# Patient Record
Sex: MALE | Race: WHITE | NOT HISPANIC OR LATINO | Employment: FULL TIME | URBAN - METROPOLITAN AREA
[De-identification: names, ages, dates, MRNs, and addresses within clinical notes are randomized per-mention and may not be internally consistent; named-entity substitution may affect disease eponyms.]

---

## 2007-02-09 LAB — EXTERNAL HIV SCREEN: NORMAL

## 2018-02-17 ENCOUNTER — OFFICE VISIT (OUTPATIENT)
Dept: URGENT CARE | Facility: CLINIC | Age: 43
End: 2018-02-17
Payer: COMMERCIAL

## 2018-02-17 VITALS
OXYGEN SATURATION: 98 % | RESPIRATION RATE: 16 BRPM | TEMPERATURE: 98.6 F | HEART RATE: 90 BPM | SYSTOLIC BLOOD PRESSURE: 171 MMHG | DIASTOLIC BLOOD PRESSURE: 95 MMHG

## 2018-02-17 DIAGNOSIS — J01.10 ACUTE FRONTAL SINUSITIS, RECURRENCE NOT SPECIFIED: Primary | ICD-10-CM

## 2018-02-17 PROCEDURE — 99203 OFFICE O/P NEW LOW 30 MIN: CPT | Performed by: NURSE PRACTITIONER

## 2018-02-17 RX ORDER — AMOXICILLIN AND CLAVULANATE POTASSIUM 875; 125 MG/1; MG/1
1 TABLET, FILM COATED ORAL EVERY 12 HOURS SCHEDULED
Qty: 20 TABLET | Refills: 0 | Status: SHIPPED | OUTPATIENT
Start: 2018-02-17 | End: 2018-02-27

## 2018-02-17 RX ORDER — LISINOPRIL 20 MG/1
20 TABLET ORAL DAILY
COMMUNITY

## 2018-02-17 NOTE — PROGRESS NOTES
Assessment/Plan:    No problem-specific Assessment & Plan notes found for this encounter  Diagnoses and all orders for this visit:    Acute frontal sinusitis, recurrence not specified  -     amoxicillin-clavulanate (AUGMENTIN) 875-125 mg per tablet; Take 1 tablet by mouth every 12 (twelve) hours for 10 days    Other orders  -     lisinopril (ZESTRIL) 20 mg tablet; Take 20 mg by mouth daily          Subjective:      Patient ID: Sally Das is a 43 y o  male  71-year-old male at urgent care with chief complaint of nasal congestion pressure cough and chest congestion for 10 days denies any fevers chills or headaches        The following portions of the patient's history were reviewed and updated as appropriate:   He  has no past medical history on file  He  does not have a problem list on file  He  has no past surgical history on file  His family history is not on file  He  has no tobacco, alcohol, and drug history on file  Current Outpatient Prescriptions   Medication Sig Dispense Refill    lisinopril (ZESTRIL) 20 mg tablet Take 20 mg by mouth daily      amoxicillin-clavulanate (AUGMENTIN) 875-125 mg per tablet Take 1 tablet by mouth every 12 (twelve) hours for 10 days 20 tablet 0     No current facility-administered medications for this visit  No current outpatient prescriptions on file prior to visit  No current facility-administered medications on file prior to visit  He has No Known Allergies       Review of Systems   Constitutional: Negative  HENT: Positive for congestion, postnasal drip, rhinorrhea, sinus pain and sinus pressure  Negative for dental problem, drooling, ear discharge, ear pain, facial swelling, hearing loss, mouth sores, nosebleeds, sneezing, sore throat, tinnitus, trouble swallowing and voice change  Eyes: Negative  Respiratory: Positive for cough  Negative for apnea, choking, chest tightness, shortness of breath, wheezing and stridor  Cardiovascular: Negative for chest pain, palpitations and leg swelling  Gastrointestinal: Negative  Negative for abdominal distention, abdominal pain, anal bleeding, blood in stool, constipation, diarrhea, nausea, rectal pain and vomiting  Endocrine: Negative  Genitourinary: Negative  Musculoskeletal: Negative  Skin: Negative  Allergic/Immunologic: Negative  Neurological: Negative  Hematological: Negative  Psychiatric/Behavioral: Negative  Objective:      BP (!) 171/95   Pulse 90   Temp 98 6 °F (37 °C)   Resp 16   SpO2 98%          Physical Exam   Constitutional: He is oriented to person, place, and time  Vital signs are normal  He appears well-developed and well-nourished  HENT:   Head: Normocephalic and atraumatic  Right Ear: Hearing, tympanic membrane, external ear and ear canal normal    Left Ear: Hearing, tympanic membrane, external ear and ear canal normal    Nose: Rhinorrhea and sinus tenderness present  Right sinus exhibits frontal sinus tenderness  Left sinus exhibits frontal sinus tenderness  Mouth/Throat: Uvula is midline and mucous membranes are normal  Posterior oropharyngeal erythema present  Eyes: Conjunctivae and EOM are normal  Pupils are equal, round, and reactive to light  Neck: Trachea normal, normal range of motion and full passive range of motion without pain  Cardiovascular: Normal rate and regular rhythm  Pulmonary/Chest: Effort normal and breath sounds normal    Abdominal: Soft  Normal appearance  Musculoskeletal: Normal range of motion  Lymphadenopathy:     He has cervical adenopathy  Right cervical: Superficial cervical adenopathy present  Left cervical: Superficial cervical adenopathy present  Neurological: He is alert and oriented to person, place, and time  Skin: Skin is warm and dry  Psychiatric: He has a normal mood and affect   His behavior is normal  Judgment and thought content normal

## 2018-12-06 ENCOUNTER — TRANSCRIBE ORDERS (OUTPATIENT)
Dept: ADMINISTRATIVE | Facility: HOSPITAL | Age: 43
End: 2018-12-06

## 2018-12-06 DIAGNOSIS — R79.89 ELEVATED LFTS: ICD-10-CM

## 2018-12-06 DIAGNOSIS — E04.1 THYROID NODULE: Primary | ICD-10-CM

## 2019-01-12 ENCOUNTER — TRANSCRIBE ORDERS (OUTPATIENT)
Dept: ADMINISTRATIVE | Facility: HOSPITAL | Age: 44
End: 2019-01-12

## 2019-01-12 ENCOUNTER — APPOINTMENT (OUTPATIENT)
Dept: LAB | Facility: HOSPITAL | Age: 44
End: 2019-01-12
Attending: INTERNAL MEDICINE
Payer: COMMERCIAL

## 2019-01-12 ENCOUNTER — HOSPITAL ENCOUNTER (OUTPATIENT)
Dept: ULTRASOUND IMAGING | Facility: HOSPITAL | Age: 44
Discharge: HOME | End: 2019-01-12
Attending: INTERNAL MEDICINE
Payer: COMMERCIAL

## 2019-01-12 DIAGNOSIS — R79.89 ELEVATED LFTS: ICD-10-CM

## 2019-01-12 DIAGNOSIS — R79.89 ELEVATED LFTS: Primary | ICD-10-CM

## 2019-01-12 DIAGNOSIS — E04.1 THYROID NODULE: ICD-10-CM

## 2019-01-12 LAB
ALBUMIN SERPL BCP-MCNC: 4.3 G/DL (ref 3.5–5.7)
ALP SERPL-CCNC: 39 U/L (ref 40–150)
ALT SERPL W P-5'-P-CCNC: 25 U/L (ref 7–52)
AST SERPL W P-5'-P-CCNC: 20 U/L (ref 13–39)
BILIRUB DIRECT SERPL-MCNC: 0.1 MG/DL (ref 0–0.2)
BILIRUB SERPL-MCNC: 0.3 MG/DL (ref 0.2–1)
PROT SERPL-MCNC: 7.1 G/DL (ref 6.4–8.9)

## 2019-01-12 PROCEDURE — 76536 US EXAM OF HEAD AND NECK: CPT

## 2019-01-12 PROCEDURE — 36415 COLL VENOUS BLD VENIPUNCTURE: CPT

## 2019-01-12 PROCEDURE — 76705 ECHO EXAM OF ABDOMEN: CPT

## 2019-01-12 PROCEDURE — 80076 HEPATIC FUNCTION PANEL: CPT

## 2019-04-05 ENCOUNTER — TRANSCRIBE ORDERS (OUTPATIENT)
Dept: LAB | Facility: MEDICAL CENTER | Age: 44
End: 2019-04-05

## 2019-04-05 ENCOUNTER — APPOINTMENT (OUTPATIENT)
Dept: LAB | Facility: MEDICAL CENTER | Age: 44
End: 2019-04-05
Payer: COMMERCIAL

## 2019-04-05 DIAGNOSIS — I10 HYPERTENSION, UNSPECIFIED TYPE: ICD-10-CM

## 2019-04-05 DIAGNOSIS — E78.5 HYPERLIPIDEMIA, UNSPECIFIED HYPERLIPIDEMIA TYPE: ICD-10-CM

## 2019-04-05 DIAGNOSIS — I10 HYPERTENSION, UNSPECIFIED TYPE: Primary | ICD-10-CM

## 2019-04-05 LAB
ALBUMIN SERPL BCP-MCNC: 4.6 G/DL (ref 3.5–5)
ALP SERPL-CCNC: 53 U/L (ref 46–116)
ALT SERPL W P-5'-P-CCNC: 40 U/L (ref 12–78)
ANION GAP SERPL CALCULATED.3IONS-SCNC: 4 MMOL/L (ref 4–13)
AST SERPL W P-5'-P-CCNC: 25 U/L (ref 5–45)
BILIRUB SERPL-MCNC: 0.45 MG/DL (ref 0.2–1)
BUN SERPL-MCNC: 12 MG/DL (ref 5–25)
CALCIUM SERPL-MCNC: 8.8 MG/DL (ref 8.3–10.1)
CHLORIDE SERPL-SCNC: 104 MMOL/L (ref 100–108)
CHOLEST SERPL-MCNC: 205 MG/DL (ref 50–200)
CO2 SERPL-SCNC: 29 MMOL/L (ref 21–32)
CREAT SERPL-MCNC: 1.18 MG/DL (ref 0.6–1.3)
GFR SERPL CREATININE-BSD FRML MDRD: 75 ML/MIN/1.73SQ M
GLUCOSE P FAST SERPL-MCNC: 91 MG/DL (ref 65–99)
HDLC SERPL-MCNC: 49 MG/DL (ref 40–60)
LDLC SERPL CALC-MCNC: 122 MG/DL (ref 0–100)
NONHDLC SERPL-MCNC: 156 MG/DL
POTASSIUM SERPL-SCNC: 3.8 MMOL/L (ref 3.5–5.3)
PROT SERPL-MCNC: 7.2 G/DL (ref 6.4–8.2)
SODIUM SERPL-SCNC: 137 MMOL/L (ref 136–145)
TRIGL SERPL-MCNC: 172 MG/DL

## 2019-04-05 PROCEDURE — 80061 LIPID PANEL: CPT

## 2019-04-05 PROCEDURE — 36415 COLL VENOUS BLD VENIPUNCTURE: CPT

## 2019-04-05 PROCEDURE — 80053 COMPREHEN METABOLIC PANEL: CPT

## 2019-09-17 ENCOUNTER — APPOINTMENT (EMERGENCY)
Dept: NON INVASIVE DIAGNOSTICS | Facility: HOSPITAL | Age: 44
End: 2019-09-17
Payer: COMMERCIAL

## 2019-09-17 ENCOUNTER — HOSPITAL ENCOUNTER (EMERGENCY)
Facility: HOSPITAL | Age: 44
Discharge: HOME | End: 2019-09-17
Attending: INTERNAL MEDICINE | Admitting: INTERNAL MEDICINE
Payer: COMMERCIAL

## 2019-09-17 VITALS
RESPIRATION RATE: 18 BRPM | DIASTOLIC BLOOD PRESSURE: 105 MMHG | TEMPERATURE: 98.6 F | OXYGEN SATURATION: 95 % | HEART RATE: 81 BPM | WEIGHT: 265 LBS | SYSTOLIC BLOOD PRESSURE: 148 MMHG | HEIGHT: 73 IN | BODY MASS INDEX: 35.12 KG/M2

## 2019-09-17 DIAGNOSIS — I80.02 SUPERFICIAL PHLEBITIS OF LEG, LEFT: ICD-10-CM

## 2019-09-17 DIAGNOSIS — L03.116 CELLULITIS OF LEFT LOWER EXTREMITY: Primary | ICD-10-CM

## 2019-09-17 LAB
ANION GAP SERPL CALCULATED.3IONS-SCNC: 10 MMOL/L (ref 4–13)
APTT PPP: 33 SECONDS (ref 23–37)
BASOPHILS # BLD AUTO: 0.1 THOUSANDS/ΜL (ref 0–0.1)
BASOPHILS NFR BLD AUTO: 1 % (ref 0–2)
BUN SERPL-MCNC: 15 MG/DL (ref 7–25)
CALCIUM SERPL-MCNC: 9.7 MG/DL (ref 8.6–10.5)
CHLORIDE SERPL-SCNC: 102 MMOL/L (ref 98–107)
CO2 SERPL-SCNC: 25 MMOL/L (ref 21–31)
CREAT SERPL-MCNC: 1.04 MG/DL (ref 0.7–1.3)
DEPRECATED D DIMER PPP: <150 NG/ML (FEU)
EOSINOPHIL # BLD AUTO: 0.1 THOUSAND/ΜL (ref 0–0.61)
EOSINOPHIL NFR BLD AUTO: 1 % (ref 0–5)
ERYTHROCYTE [DISTWIDTH] IN BLOOD BY AUTOMATED COUNT: 12.8 % (ref 11.5–14.5)
GFR SERPL CREATININE-BSD FRML MDRD: 87 ML/MIN/1.73SQ M
GLUCOSE SERPL-MCNC: 91 MG/DL (ref 65–99)
HCT VFR BLD AUTO: 40.5 % (ref 42–47)
HGB BLD-MCNC: 14.3 G/DL (ref 14–18)
INR PPP: 1.09 (ref 0.9–1.5)
LYMPHOCYTES # BLD AUTO: 2.3 THOUSANDS/ΜL (ref 0.6–4.47)
LYMPHOCYTES NFR BLD AUTO: 28 % (ref 21–51)
MCH RBC QN AUTO: 31.5 PG (ref 26–34)
MCHC RBC AUTO-ENTMCNC: 35.4 G/DL (ref 31–37)
MCV RBC AUTO: 89 FL (ref 81–99)
MONOCYTES # BLD AUTO: 0.7 THOUSAND/ΜL (ref 0.17–1.22)
MONOCYTES NFR BLD AUTO: 8 % (ref 2–12)
NEUTROPHILS # BLD AUTO: 5.1 THOUSANDS/ΜL (ref 1.4–6.5)
NEUTS SEG NFR BLD AUTO: 62 % (ref 42–75)
PLATELET # BLD AUTO: 290 THOUSANDS/UL (ref 149–390)
PMV BLD AUTO: 7.2 FL (ref 8.6–11.7)
POTASSIUM SERPL-SCNC: 3.8 MMOL/L (ref 3.5–5.5)
PROTHROMBIN TIME: 12.7 SECONDS (ref 10.2–13)
RBC # BLD AUTO: 4.55 MILLION/UL (ref 4.3–5.9)
SODIUM SERPL-SCNC: 137 MMOL/L (ref 134–143)
WBC # BLD AUTO: 8.2 THOUSAND/UL (ref 4.8–10.8)

## 2019-09-17 PROCEDURE — 80048 BASIC METABOLIC PNL TOTAL CA: CPT | Performed by: INTERNAL MEDICINE

## 2019-09-17 PROCEDURE — 85610 PROTHROMBIN TIME: CPT | Performed by: INTERNAL MEDICINE

## 2019-09-17 PROCEDURE — 85730 THROMBOPLASTIN TIME PARTIAL: CPT | Performed by: INTERNAL MEDICINE

## 2019-09-17 PROCEDURE — 36415 COLL VENOUS BLD VENIPUNCTURE: CPT | Performed by: INTERNAL MEDICINE

## 2019-09-17 PROCEDURE — 99284 EMERGENCY DEPT VISIT MOD MDM: CPT

## 2019-09-17 PROCEDURE — 85379 FIBRIN DEGRADATION QUANT: CPT | Performed by: INTERNAL MEDICINE

## 2019-09-17 PROCEDURE — 85025 COMPLETE CBC W/AUTO DIFF WBC: CPT | Performed by: INTERNAL MEDICINE

## 2019-09-17 PROCEDURE — 96372 THER/PROPH/DIAG INJ SC/IM: CPT

## 2019-09-17 RX ORDER — QUETIAPINE 300 MG/1
TABLET, FILM COATED, EXTENDED RELEASE ORAL DAILY
COMMUNITY
Start: 2019-03-21

## 2019-09-17 RX ORDER — AMLODIPINE BESYLATE 5 MG/1
TABLET ORAL DAILY
COMMUNITY
Start: 2019-03-11

## 2019-09-17 RX ORDER — ATORVASTATIN CALCIUM 10 MG/1
TABLET, FILM COATED ORAL
COMMUNITY
Start: 2019-03-11

## 2019-09-17 RX ORDER — CARBAMAZEPINE 200 MG/1
TABLET ORAL
COMMUNITY
Start: 2018-12-06

## 2019-09-17 RX ORDER — CEPHALEXIN 500 MG/1
500 CAPSULE ORAL ONCE
Status: COMPLETED | OUTPATIENT
Start: 2019-09-17 | End: 2019-09-17

## 2019-09-17 RX ORDER — CEPHALEXIN 500 MG/1
500 CAPSULE ORAL EVERY 8 HOURS SCHEDULED
Qty: 30 CAPSULE | Refills: 0 | Status: SHIPPED | OUTPATIENT
Start: 2019-09-17 | End: 2019-09-27

## 2019-09-17 RX ADMIN — ENOXAPARIN SODIUM 120 MG: 120 INJECTION SUBCUTANEOUS at 21:39

## 2019-09-17 RX ADMIN — CEPHALEXIN 500 MG: 500 CAPSULE ORAL at 21:21

## 2019-09-17 NOTE — ED PROVIDER NOTES
History  No chief complaint on file  70-year-old male started with redness and swelling as well as some pain within the left inner thigh  It radiates down below his knee as well  Is warm to the touch  Some discomfort with walking  His wife notes he has been more swollen in his ankle since he started his new job where he is on his  Feet for at least 12 hours a day  She also mentions he tested positive for factor 5 Leiden as a carrier  He has never had a blood clot before  Notes no injury to leg  Negative Homans on that left leg as well  Prior to Admission Medications   Prescriptions Last Dose Informant Patient Reported? Taking?   lisinopril (ZESTRIL) 20 mg tablet   Yes No   Sig: Take 20 mg by mouth daily      Facility-Administered Medications: None       No past medical history on file  No past surgical history on file  No family history on file  I have reviewed and agree with the history as documented  Social History     Tobacco Use    Smoking status: Not on file   Substance Use Topics    Alcohol use: Not on file    Drug use: Not on file        Review of Systems   Constitutional: Negative for chills and fever  HENT: Negative for rhinorrhea and sore throat  Eyes: Negative for visual disturbance  Respiratory: Negative for cough and shortness of breath  Cardiovascular: Negative for chest pain and leg swelling  Gastrointestinal: Negative for abdominal pain, diarrhea, nausea and vomiting  Genitourinary: Negative for dysuria  Musculoskeletal: Negative for back pain, joint swelling and myalgias  Negative Homans   Skin: Negative for rash  Erythema left lower extremity from the proximal thigh to the proximal calf  Neurological: Negative for dizziness and headaches  Psychiatric/Behavioral: Negative for confusion  All other systems reviewed and are negative        Physical Exam  Physical Exam    Vital Signs  ED Triage Vitals   Temp Pulse Resp BP SpO2   -- -- -- -- --      Temp src Heart Rate Source Patient Position - Orthostatic VS BP Location FiO2 (%)   -- -- -- -- --      Pain Score       --           There were no vitals filed for this visit  Visual Acuity      ED Medications  Medications - No data to display    Diagnostic Studies  Results Reviewed     None                 No orders to display              Procedures  Procedures       ED Course  ED Course as of Sep 17 2242   Tue Sep 17, 2019   2107 D-DIMER QUANTITATIVE: <150   2107 D-DIMER QUANTITATIVE: <150   2112  Given negative D-dimer will return tomorrow for Doppler study  Suspect superficial DVT  Given factor 5 Leiden issue will give injection of Lovenox prior to discharge  Will also discharge with Keflex and have him follow up  at primary care's office in a few days  MDM    Disposition  Final diagnoses:   None     ED Disposition     None      Follow-up Information    None         Patient's Medications   Discharge Prescriptions    No medications on file     No discharge procedures on file      ED Provider  Electronically Signed by           Ena Bee DO  09/17/19 9652

## 2019-09-18 ENCOUNTER — HOSPITAL ENCOUNTER (OUTPATIENT)
Dept: NON INVASIVE DIAGNOSTICS | Facility: HOSPITAL | Age: 44
Discharge: HOME | End: 2019-09-18
Attending: INTERNAL MEDICINE
Payer: COMMERCIAL

## 2019-09-18 ENCOUNTER — HOSPITAL ENCOUNTER (EMERGENCY)
Facility: HOSPITAL | Age: 44
Discharge: HOME | End: 2019-09-18
Attending: EMERGENCY MEDICINE
Payer: COMMERCIAL

## 2019-09-18 VITALS
BODY MASS INDEX: 23.8 KG/M2 | OXYGEN SATURATION: 97 % | SYSTOLIC BLOOD PRESSURE: 160 MMHG | WEIGHT: 170 LBS | HEIGHT: 71 IN | HEART RATE: 90 BPM | DIASTOLIC BLOOD PRESSURE: 90 MMHG | TEMPERATURE: 98.6 F | RESPIRATION RATE: 16 BRPM

## 2019-09-18 DIAGNOSIS — I10 HYPERTENSION: ICD-10-CM

## 2019-09-18 DIAGNOSIS — I80.02 SUPERFICIAL THROMBOPHLEBITIS OF LEFT LEG: Primary | ICD-10-CM

## 2019-09-18 DIAGNOSIS — I80.02 SUPERFICIAL PHLEBITIS OF LEG, LEFT: ICD-10-CM

## 2019-09-18 PROCEDURE — 99283 EMERGENCY DEPT VISIT LOW MDM: CPT

## 2019-09-18 PROCEDURE — 93971 EXTREMITY STUDY: CPT

## 2019-09-18 PROCEDURE — 93971 EXTREMITY STUDY: CPT | Performed by: SURGERY

## 2019-09-18 NOTE — ED TRIAGE NOTES
Patient reports left leg redness pain and swelling in left thigh  Patient denies any reports of trauma or injury

## 2019-09-18 NOTE — DISCHARGE INSTRUCTIONS
TAKE MEDICATION DAILY AT THE SAME TIME   NSAIDS FOR INFLAMMATION AND PAIN RELIEF, CONTINUE KEFLEX UNTIL COMPLETED  FOLLOW-UP WITH PCP FOR CONTINUED MONITORING AND CARE

## 2019-09-18 NOTE — ED PROVIDER NOTES
History  Chief Complaint   Patient presents with    Evaluation of Abnormal Diagnostic Test     positive doppler of the left leg     41-year-old male presents emergency room at recommendation due to superficial blood clot noted  Patient was seen in the emergency room last evening due to pain in his left leg which has worsened over the past 4 days with redness and warmth  Patient had a negative D-dimer last evening diagnosed with superficial thrombophlebitis and a venous duplex was ordered for this morning  Patient had venous duplex and preliminary results was positive for greater saphenous vein with extension from the ankle to 1 4 cm away from the femoral junction  Patient was advised to present to the emergency room for anticoagulation treatment  Patient denies chest pain shortness of breath fevers chills, and denies previous history of DVT  Patient stated he believes he is positive for Factor 5 Leiden  History provided by:  Patient   used: No    Leg Pain   Location:  Leg  Time since incident:  4 days  Injury: no    Leg location:  L leg  Pain details:     Quality:  Dull    Radiates to:  Does not radiate    Severity:  Moderate    Onset quality:  Gradual    Duration:  4 days    Timing:  Constant    Progression:  Worsening  Chronicity:  New  Dislocation: no    Prior injury to area:  No  Relieved by:  Nothing  Worsened by:  Extension, flexion and bearing weight  Ineffective treatments:  None tried  Associated symptoms: no back pain, no decreased ROM, no fatigue, no fever, no muscle weakness, no neck pain, no numbness, no swelling and no tingling    Risk factors: no concern for non-accidental trauma          No Known Allergies      Prior to Admission Medications   Prescriptions Last Dose Informant Patient Reported? Taking?    QUEtiapine (SEROQUEL XR) 300 mg 24 hr tablet   Yes No   Sig: Take by mouth Daily   amLODIPine (NORVASC) 5 mg tablet   Yes No   Sig: Take by mouth Daily   atorvastatin (LIPITOR) 10 mg tablet   Yes No   Sig: TAKE ONE TABLET BY MOUTH ONCE DAILY   carBAMazepine (EPITOL) 200 mg tablet   Yes No   Sig: take 2 tablet by oral route  every 12 hours  (2 in the am & 2 in the pm)   cephalexin (KEFLEX) 500 mg capsule   No No   Sig: Take 1 capsule (500 mg total) by mouth every 8 (eight) hours for 10 days   lisinopril (ZESTRIL) 20 mg tablet   Yes No   Sig: Take 20 mg by mouth daily      Facility-Administered Medications: None       Past Medical History:   Diagnosis Date    Disease of thyroid gland     Hyperlipidemia     Hypertension        Past Surgical History:   Procedure Laterality Date    TONSILLECTOMY         History reviewed  No pertinent family history  I have reviewed and agree with the history as documented  Social History     Tobacco Use    Smoking status: Former Smoker     Last attempt to quit:      Years since quittin 7    Smokeless tobacco: Never Used   Substance Use Topics    Alcohol use: Not Currently    Drug use: Never        Review of Systems   Constitutional: Negative for chills, diaphoresis, fatigue and fever  Respiratory: Negative for cough, shortness of breath, wheezing and stridor  Cardiovascular: Negative for chest pain, palpitations and leg swelling  Gastrointestinal: Negative for abdominal pain, constipation, diarrhea, nausea and vomiting  Genitourinary: Negative for difficulty urinating, dysuria, frequency, hematuria and urgency  Musculoskeletal: Positive for myalgias  Negative for back pain, gait problem and neck pain  Left leg as noted in HPI   Skin: Positive for color change  Negative for rash  Neurological: Negative for dizziness, syncope, weakness, light-headedness and headaches  All other systems reviewed and are negative  Physical Exam  Physical Exam   Constitutional: He is oriented to person, place, and time  He appears well-developed and well-nourished  HENT:   Head: Normocephalic and atraumatic     Eyes: Pupils are equal, round, and reactive to light  Conjunctivae and EOM are normal    Neck: Normal range of motion  Neck supple  No tracheal deviation present  Cardiovascular: Normal rate, regular rhythm, normal heart sounds and intact distal pulses  Pulmonary/Chest: Effort normal and breath sounds normal  No respiratory distress  He has no wheezes  Abdominal: Soft  Bowel sounds are normal  He exhibits no distension  There is no tenderness  Musculoskeletal: Normal range of motion  He exhibits tenderness  He exhibits no edema  Left upper leg: He exhibits tenderness and swelling  He exhibits no deformity and no laceration  Legs:  Neurological: He is alert and oriented to person, place, and time  Skin: Skin is warm and dry  No rash noted  There is erythema  Nursing note and vitals reviewed  Vital Signs  ED Triage Vitals   Temperature Pulse Respirations Blood Pressure SpO2   09/18/19 0931 09/18/19 0931 09/18/19 1028 09/18/19 1028 09/18/19 0931   98 6 °F (37 °C) 96 16 160/90 97 %      Temp Source Heart Rate Source Patient Position - Orthostatic VS BP Location FiO2 (%)   09/18/19 0931 09/18/19 1028 09/18/19 0931 09/18/19 1028 --   Temporal Monitor Sitting Left arm       Pain Score       09/18/19 0931       4           Vitals:    09/18/19 0931 09/18/19 1028   BP:  160/90   Pulse: 96 90   Patient Position - Orthostatic VS: Sitting Sitting         Visual Acuity      ED Medications  Medications - No data to display    Diagnostic Studies  Results Reviewed     None                 No orders to display              Procedures  Procedures       ED Course  ED Course as of Sep 18 1055   Wed Sep 18, 2019   0951 Bilateral lower extremity venous duplex results from this morning 09/18/2019:       CONCLUSION:  RIGHT LOWER LIMB LIMITED:  Evaluation shows no evidence of thrombus in the common femoral vein  Doppler evaluation shows a normal response to augmentation maneuvers       LEFT LOWER LIMB:  No evidence of acute or chronic deep vein thrombosis  There is evidence of superficial thrombophlebitis noted from the ankle up to  1 4cm from the junction with the common femoral vein  This is occlusive from the  proximal calf to the proximal thigh  Doppler evaluation shows a normal response to augmentation maneuvers  Popliteal, posterior tibial and anterior tibial arterial Doppler waveforms are  triphasic  1015 Call placed to [address] regarding price of xarelto  most recent up-to-date recommendations favor prophylactic treatment of 45 days by the low-molecular weight heparin 40 mg daily vs arixtra 2 5 mg daily vs xarelto 10mg daily with repeat vascular duplex at the end of treatment time frame to determine if additional treatment is warranted  Patient opted for Xarelto  Prescription was sent to [address] [place] [address] and patient was recommended to continue NSAIDs for pain and swelling as well as Keflex and follow up with PCP closely  Patient denies chest tightness pain shortness of breath fevers chills, anticipatory guidance given, Stable for discharge home with outtpatient follow up        call placed to Dr Sherman Blizzard, patient's PCP for update and follow up recommendations                    MDM  Number of Diagnoses or Management Options  Hypertension:   Superficial thrombophlebitis of left leg: new and requires workup     Amount and/or Complexity of Data Reviewed  Tests in the radiology section of CPT®: ordered and reviewed    Risk of Complications, Morbidity, and/or Mortality  Presenting problems: moderate  Diagnostic procedures: moderate  Management options: moderate    Patient Progress  Patient progress: stable      Disposition  Final diagnoses:   Superficial thrombophlebitis of left leg   Hypertension     Time reflects when diagnosis was documented in both MDM as applicable and the Disposition within this note     Time User Action Codes Description Comment    9/18/2019 10:25 AM Nataliya HOLMAN Add [I80 02] Superficial thrombophlebitis of left leg     9/18/2019 10:29 AM Fany Pedraza Add [I10] Hypertension       ED Disposition     ED Disposition Condition Date/Time Comment    Discharge Stable Wed Sep 18, 2019 10:13 AM Karen Batista discharge to home/self care  Follow-up Information     Follow up With Specialties Details Why [address], DO Internal Medicine, Emergency Medicine Schedule an appointment as soon as possible for a visit in 1 week If symptoms worsen return to ER  [address] [facility]  [phone]            Discharge Medication List as of 9/18/2019 10:28 AM      START taking these medications    Details   !! rivaroxaban (XARELTO) 10 mg tablet Take 1 tablet (10 mg total) by mouth daily with breakfast, Starting Wed 9/18/2019, Until Fri 10/18/2019, Normal      !! rivaroxaban (XARELTO) 10 mg tablet Take 1 tablet (10 mg total) by mouth daily for 15 days COMPLETE FOLLOWING THE INITIAL 30 DAYS OF TREATMENT TO MAKE 45 DAYS TOTAL TREATMENT , Starting Wed 9/18/2019, Until Thu 10/3/2019, Normal       !! - Potential duplicate medications found  Please discuss with provider  CONTINUE these medications which have NOT CHANGED    Details   amLODIPine (NORVASC) 5 mg tablet Take by mouth Daily, Starting Mon 3/11/2019, Historical Med      atorvastatin (LIPITOR) 10 mg tablet TAKE ONE TABLET BY MOUTH ONCE DAILY, Historical Med      carBAMazepine (EPITOL) 200 mg tablet take 2 tablet by oral route  every 12 hours  (2 in the am & 2 in the pm), Historical Med      cephalexin (KEFLEX) 500 mg capsule Take 1 capsule (500 mg total) by mouth every 8 (eight) hours for 10 days, Starting Tue 9/17/2019, Until Fri 9/27/2019, Normal      lisinopril (ZESTRIL) 20 mg tablet Take 20 mg by mouth daily, Historical Med      QUEtiapine (SEROQUEL XR) 300 mg 24 hr tablet Take by mouth Daily, Starting Thu 3/21/2019, Historical Med           No discharge procedures on file      ED Provider  Electronically Signed by           Michelle Ferro PA-C  09/18/19 1051

## 2019-10-17 ENCOUNTER — TRANSCRIBE ORDERS (OUTPATIENT)
Dept: ADMINISTRATIVE | Facility: HOSPITAL | Age: 44
End: 2019-10-17

## 2019-10-17 DIAGNOSIS — I82.4Y2 ACUTE EMBOLISM AND THROMBOSIS OF DEEP VEIN OF LEFT PROXIMAL LOWER EXTREMITY (HCC): Primary | ICD-10-CM

## 2019-11-13 ENCOUNTER — HOSPITAL ENCOUNTER (OUTPATIENT)
Dept: NON INVASIVE DIAGNOSTICS | Facility: HOSPITAL | Age: 44
Discharge: HOME | End: 2019-11-13
Attending: INTERNAL MEDICINE
Payer: COMMERCIAL

## 2019-11-13 DIAGNOSIS — I82.4Y2 ACUTE EMBOLISM AND THROMBOSIS OF DEEP VEIN OF LEFT PROXIMAL LOWER EXTREMITY (HCC): ICD-10-CM

## 2019-11-13 PROCEDURE — 93971 EXTREMITY STUDY: CPT | Performed by: SURGERY

## 2019-11-13 PROCEDURE — 93971 EXTREMITY STUDY: CPT

## 2022-01-14 ENCOUNTER — APPOINTMENT (OUTPATIENT)
Dept: LAB | Facility: MEDICAL CENTER | Age: 47
End: 2022-01-14
Payer: COMMERCIAL

## 2022-01-14 DIAGNOSIS — R53.83 OTHER FATIGUE: ICD-10-CM

## 2022-01-14 DIAGNOSIS — I10 HYPERTENSION, UNSPECIFIED TYPE: ICD-10-CM

## 2022-01-14 DIAGNOSIS — R63.4 WEIGHT LOSS: ICD-10-CM

## 2022-01-14 DIAGNOSIS — E78.5 HYPERLIPIDEMIA, UNSPECIFIED HYPERLIPIDEMIA TYPE: ICD-10-CM

## 2022-01-14 DIAGNOSIS — R79.89 LOW TESTOSTERONE: ICD-10-CM

## 2022-01-14 LAB
ALBUMIN SERPL BCP-MCNC: 4.1 G/DL (ref 3.5–5)
ALP SERPL-CCNC: 59 U/L (ref 46–116)
ALT SERPL W P-5'-P-CCNC: 34 U/L (ref 12–78)
ANION GAP SERPL CALCULATED.3IONS-SCNC: 6 MMOL/L (ref 4–13)
AST SERPL W P-5'-P-CCNC: 26 U/L (ref 5–45)
BILIRUB SERPL-MCNC: 0.69 MG/DL (ref 0.2–1)
BUN SERPL-MCNC: 13 MG/DL (ref 5–25)
CALCIUM SERPL-MCNC: 9.4 MG/DL (ref 8.3–10.1)
CHLORIDE SERPL-SCNC: 103 MMOL/L (ref 100–108)
CHOLEST SERPL-MCNC: 204 MG/DL
CO2 SERPL-SCNC: 28 MMOL/L (ref 21–32)
CREAT SERPL-MCNC: 1.2 MG/DL (ref 0.6–1.3)
ERYTHROCYTE [DISTWIDTH] IN BLOOD BY AUTOMATED COUNT: 12.1 % (ref 11.6–15.1)
GFR SERPL CREATININE-BSD FRML MDRD: 72 ML/MIN/1.73SQ M
GLUCOSE P FAST SERPL-MCNC: 100 MG/DL (ref 65–99)
HCT VFR BLD AUTO: 41.6 % (ref 36.5–49.3)
HDLC SERPL-MCNC: 52 MG/DL
HGB BLD-MCNC: 14.4 G/DL (ref 12–17)
LDLC SERPL CALC-MCNC: 131 MG/DL (ref 0–100)
MCH RBC QN AUTO: 31.3 PG (ref 26.8–34.3)
MCHC RBC AUTO-ENTMCNC: 34.6 G/DL (ref 31.4–37.4)
MCV RBC AUTO: 90 FL (ref 82–98)
NONHDLC SERPL-MCNC: 152 MG/DL
PLATELET # BLD AUTO: 343 THOUSANDS/UL (ref 149–390)
PMV BLD AUTO: 8.9 FL (ref 8.9–12.7)
POTASSIUM SERPL-SCNC: 3.5 MMOL/L (ref 3.5–5.3)
PROT SERPL-MCNC: 8 G/DL (ref 6.4–8.2)
RBC # BLD AUTO: 4.6 MILLION/UL (ref 3.88–5.62)
SODIUM SERPL-SCNC: 137 MMOL/L (ref 136–145)
TRIGL SERPL-MCNC: 104 MG/DL
TSH SERPL DL<=0.05 MIU/L-ACNC: 0.84 UIU/ML (ref 0.36–3.74)
WBC # BLD AUTO: 6.2 THOUSAND/UL (ref 4.31–10.16)

## 2022-01-14 PROCEDURE — 80061 LIPID PANEL: CPT

## 2022-01-14 PROCEDURE — 36415 COLL VENOUS BLD VENIPUNCTURE: CPT

## 2022-01-14 PROCEDURE — 84443 ASSAY THYROID STIM HORMONE: CPT

## 2022-01-14 PROCEDURE — 84402 ASSAY OF FREE TESTOSTERONE: CPT

## 2022-01-14 PROCEDURE — 80053 COMPREHEN METABOLIC PANEL: CPT

## 2022-01-14 PROCEDURE — 84403 ASSAY OF TOTAL TESTOSTERONE: CPT

## 2022-01-14 PROCEDURE — 85027 COMPLETE CBC AUTOMATED: CPT

## 2022-01-15 LAB
TESTOST FREE SERPL-MCNC: 5.4 PG/ML (ref 6.8–21.5)
TESTOST SERPL-MCNC: 298 NG/DL (ref 264–916)

## 2022-02-11 ENCOUNTER — APPOINTMENT (OUTPATIENT)
Dept: LAB | Facility: MEDICAL CENTER | Age: 47
End: 2022-02-11
Payer: COMMERCIAL

## 2022-02-11 DIAGNOSIS — E29.1 MALE HYPOGONADISM: Primary | ICD-10-CM

## 2022-02-11 DIAGNOSIS — E29.1 TESTOSTERONE DEFICIENCY IN MALE: ICD-10-CM

## 2022-02-11 PROCEDURE — 84402 ASSAY OF FREE TESTOSTERONE: CPT

## 2022-02-11 PROCEDURE — 36415 COLL VENOUS BLD VENIPUNCTURE: CPT

## 2022-02-11 PROCEDURE — 84403 ASSAY OF TOTAL TESTOSTERONE: CPT

## 2022-02-12 LAB
TESTOST FREE SERPL-MCNC: 8.5 PG/ML (ref 6.8–21.5)
TESTOST SERPL-MCNC: 312 NG/DL (ref 264–916)

## 2022-12-27 ENCOUNTER — OFFICE VISIT (OUTPATIENT)
Dept: URGENT CARE | Facility: CLINIC | Age: 47
End: 2022-12-27

## 2022-12-27 VITALS
HEIGHT: 73 IN | OXYGEN SATURATION: 98 % | SYSTOLIC BLOOD PRESSURE: 184 MMHG | TEMPERATURE: 98.5 F | DIASTOLIC BLOOD PRESSURE: 100 MMHG | HEART RATE: 76 BPM | BODY MASS INDEX: 33.8 KG/M2 | WEIGHT: 255 LBS | RESPIRATION RATE: 18 BRPM

## 2022-12-27 DIAGNOSIS — R05.1 ACUTE COUGH: ICD-10-CM

## 2022-12-27 DIAGNOSIS — J01.90 ACUTE SINUSITIS, RECURRENCE NOT SPECIFIED, UNSPECIFIED LOCATION: Primary | ICD-10-CM

## 2022-12-27 RX ORDER — PREDNISONE 20 MG/1
20 TABLET ORAL DAILY
Qty: 5 TABLET | Refills: 0 | Status: SHIPPED | OUTPATIENT
Start: 2022-12-27 | End: 2023-01-01

## 2022-12-27 RX ORDER — AMOXICILLIN AND CLAVULANATE POTASSIUM 875; 125 MG/1; MG/1
1 TABLET, FILM COATED ORAL EVERY 12 HOURS SCHEDULED
Qty: 20 TABLET | Refills: 0 | Status: SHIPPED | OUTPATIENT
Start: 2022-12-27 | End: 2023-01-06

## 2022-12-27 NOTE — PROGRESS NOTES
[facility]    NAME: Vertis Hodgkin is a 52 y o  male  : 1975    MRN: [id number]  DATE: 2022  TIME: 3:58 PM    Assessment and Plan   Acute sinusitis, recurrence not specified, unspecified location [J01 90]  1  Acute sinusitis, recurrence not specified, unspecified location  amoxicillin-clavulanate (AUGMENTIN) 875-125 mg per tablet    predniSONE 20 mg tablet      2  Acute cough  Covid/Flu-Office Collect      Advised patient to avoid sudafed  Take plain mucinex or coricidin HBP due to BP being elevated  Patient Instructions     Patient Instructions   I have prescribed an antibiotic for the infection  Please take the antibiotic as prescribed and finish the entire prescription  I recommend that the patient takes an over the counter probiotic or eats yogurt with live cultures in it [place]) to keep good bacteria in the gut and help prevent diarrhea  Wash hands frequently to prevent the spread of infection  Can use over the counter cough and cold medications to help with symptoms  Ibuprofen and/or tylenol as needed for pain or fever  If not improving over the next 7-10 days, follow up with PCP  Chief Complaint     Chief Complaint   Patient presents with   • Cold Like Symptoms     Patient c/o cough, chest congestion, and headache that started 4 days ago  History of Present Illness   52year old male here with complaint of nasal congestion, headache, sinus pressure, cough for the past 4 days  Reports chest congestion  No fever, chills  Review of Systems   Review of Systems   Constitutional: Positive for fatigue  Negative for chills and fever  HENT: Positive for congestion, postnasal drip, sinus pressure and sore throat  Negative for ear pain  Respiratory: Positive for cough  Negative for shortness of breath and wheezing  Neurological: Positive for headaches  All other systems reviewed and are negative        Current Medications     Current Outpatient Medications:   •  amLODIPine (NORVASC) 5 mg tablet, Take by mouth Daily, Disp: , Rfl:   •  amoxicillin-clavulanate (AUGMENTIN) 875-125 mg per tablet, Take 1 tablet by mouth every 12 (twelve) hours for 10 days, Disp: 20 tablet, Rfl: 0  •  atorvastatin (LIPITOR) 10 mg tablet, TAKE ONE TABLET BY MOUTH ONCE DAILY, Disp: , Rfl:   •  carBAMazepine (TEGretol) 200 mg tablet, take 2 tablet by oral route  every 12 hours  (2 in the am & 2 in the pm), Disp: , Rfl:   •  lisinopril (ZESTRIL) 20 mg tablet, Take 20 mg by mouth daily, Disp: , Rfl:   •  predniSONE 20 mg tablet, Take 1 tablet (20 mg total) by mouth daily for 5 days, Disp: 5 tablet, Rfl: 0  •  QUEtiapine (SEROquel XR) 300 mg 24 hr tablet, Take by mouth Daily, Disp: , Rfl:   •  rivaroxaban (XARELTO) 10 mg tablet, Take 1 tablet (10 mg total) by mouth daily with breakfast (Patient not taking: Reported on 2022), Disp: 30 tablet, Rfl: 0    Current Allergies     Allergies as of 2022   • (No Known Allergies)          The following portions of the patient's history were reviewed and updated as appropriate: allergies, current medications, past family history, past medical history, past social history, past surgical history and problem list    Past Medical History:   Diagnosis Date   • Disease of thyroid gland    • Hyperlipidemia    • Hypertension      Past Surgical History:   Procedure Laterality Date   • TONSILLECTOMY       No family history on file    Social History     Socioeconomic History   • Marital status: /Civil Union     Spouse name: Not on file   • Number of children: Not on file   • Years of education: Not on file   • Highest education level: Not on file   Occupational History   • Not on file   Tobacco Use   • Smoking status: Former     Types: Cigarettes     Quit date:      Years since quittin 0   • Smokeless tobacco: Never   Substance and Sexual Activity   • Alcohol use: Not Currently   • Drug use: Never   • Sexual activity: Not on file   Other Topics Concern   • Not on file   Social History Narrative   • Not on file     Social Determinants of Health     Financial Resource Strain: Not on file   Food Insecurity: Not on file   Transportation Needs: Not on file   Physical Activity: Not on file   Stress: Not on file   Social Connections: Not on file   Intimate Partner Violence: Not on file   Housing Stability: Not on file     Medications have been verified  Objective   BP (!) 184/100 Comment: manual  Pulse 76   Temp 98 5 °F (36 9 °C) (Temporal)   Resp 18   Ht 6' 1" (1 854 m)   Wt 116 kg (255 lb)   SpO2 98%   BMI 33 64 kg/m²      Physical Exam   Physical Exam  Vitals reviewed  Constitutional:       General: He is not in acute distress  Appearance: He is well-developed  HENT:      Head: Normocephalic and atraumatic  Right Ear: Tympanic membrane normal       Left Ear: Tympanic membrane normal       Nose: Congestion present  No mucosal edema  Mouth/Throat:      Mouth: Mucous membranes are moist       Pharynx: Posterior oropharyngeal erythema present  Cardiovascular:      Rate and Rhythm: Normal rate and regular rhythm  Heart sounds: Normal heart sounds  Pulmonary:      Effort: Pulmonary effort is normal  No respiratory distress  Breath sounds: Normal breath sounds

## 2022-12-28 LAB
FLUAV RNA RESP QL NAA+PROBE: POSITIVE
FLUBV RNA RESP QL NAA+PROBE: NEGATIVE
SARS-COV-2 RNA RESP QL NAA+PROBE: NEGATIVE

## 2023-02-24 ENCOUNTER — HOSPITAL ENCOUNTER (OUTPATIENT)
Dept: CT IMAGING | Facility: HOSPITAL | Age: 48
End: 2023-02-24
Attending: INTERNAL MEDICINE

## 2023-02-24 DIAGNOSIS — E26.9 HYPERALDOSTERONISM, UNSPECIFIED (HCC): ICD-10-CM

## 2023-02-24 RX ADMIN — IOHEXOL 100 ML: 350 INJECTION, SOLUTION INTRAVENOUS at 08:22

## 2023-05-05 ENCOUNTER — HOSPITAL ENCOUNTER (OUTPATIENT)
Dept: ULTRASOUND IMAGING | Facility: HOSPITAL | Age: 48
End: 2023-05-05
Attending: STUDENT IN AN ORGANIZED HEALTH CARE EDUCATION/TRAINING PROGRAM

## 2023-05-05 DIAGNOSIS — E04.1 THYROID NODULE: ICD-10-CM

## 2023-06-02 ENCOUNTER — OFFICE VISIT (OUTPATIENT)
Dept: ENDOCRINOLOGY | Facility: CLINIC | Age: 48
End: 2023-06-02
Payer: COMMERCIAL

## 2023-06-02 VITALS
HEIGHT: 73 IN | BODY MASS INDEX: 35.65 KG/M2 | HEART RATE: 69 BPM | WEIGHT: 269 LBS | SYSTOLIC BLOOD PRESSURE: 160 MMHG | DIASTOLIC BLOOD PRESSURE: 100 MMHG

## 2023-06-02 DIAGNOSIS — E03.8 SECONDARY HYPOTHYROIDISM: ICD-10-CM

## 2023-06-02 DIAGNOSIS — E27.8 ADRENAL NODULE (HCC): Primary | ICD-10-CM

## 2023-06-02 DIAGNOSIS — I10 HYPERTENSION, UNSPECIFIED TYPE: ICD-10-CM

## 2023-06-02 DIAGNOSIS — E04.1 THYROID NODULE: ICD-10-CM

## 2023-06-02 DIAGNOSIS — G47.33 OSA (OBSTRUCTIVE SLEEP APNEA): ICD-10-CM

## 2023-06-02 PROCEDURE — 99214 OFFICE O/P EST MOD 30 MIN: CPT | Performed by: STUDENT IN AN ORGANIZED HEALTH CARE EDUCATION/TRAINING PROGRAM

## 2023-06-05 NOTE — PROGRESS NOTES
Tangela Gramajo 52 y o  male MRN: [id number]    Encounter: [id number]      Assessment/Plan     1  Adrenal nodule - indeterminate left sided adrenal nodule 0 7 cm un-enhanced HU 27, absolute washout 57 7%, relative washout 38%  Positive case detection for hyperaldosteronism with suppressed renin <1, tanya >10  24h urine sodium, aldosterone, and creatinine requested, but not performed by requesting lab  Negative plasma and urine evaluation for excess catecholamines/metanephrines  24h urine cortisol not completed by labs  Due to current use of carbamazepine (CY inducer), would defer ODST due to potential for false positive result  Will re-attempt 24h urine cortisol test, also 24h urine sodium and aldosterone  Would recommend salt generous diet several days leading up to study  Patient also presently on spironolactone 50 mg daily, which needs to be taken into account  Repeat CTAP adrenal protocol recommended for Aug 2023 for monitoring due to indeterminate findings  2  Bipolar d/o - on carbamazepine and seroquel    3  Thyroid nodule - stable findings on thyroid US extending period of 7 years  No indication for repeat studies  4  Hypertension - poorly controlled  5  Current use of testosterone     6  JOLYNN - this may also contribute to uncontrolled hypertension  Chaitanya Schwartz is non-compliant with CPAP  Discussed adverse cardiometabolic outcomes of untreated JOLYNN  Will refer to sleep medicine for follow up evaluation    7  Secondary hypothyroidism - normal TSH, low free T4  This may be due to carbamazepine  Will recommend repeat study for confirmation       Problem List Items Addressed This Visit    None  Visit Diagnoses     Adrenal nodule ([address] )    -  Primary    Relevant Orders    CT abdomen w wo contrast    Creatinine, urine, 24 hour Lab Collect    Sodium, urine, 24 hour    Aldosterone, urine, 24 hour    Cortisol, Free, Urine, 24 Hour    Thyroid nodule        JOLYNN (obstructive sleep apnea)        Relevant Orders Ambulatory referral to Sleep Medicine    Hypertension, unspecified type        Secondary hypothyroidism        Relevant Orders    T4, free Lab Collect    TSH, 3rd generation Lab Collect        RTC 2-3 mo    CC: adrenal nodule, thyroid nodule    History of Present Illness     HPI:    Aba Mills returns today in follow up adrenal nodule, hypertension  No acute concerns today, chiefly here to review labs and imaging  His labs were completed in April, however their report was not immediately available to me at the time of the visit  [facility] labs had to be contacted for a faxed report to review  Their results will be scanned below  For hypertension, he takes amlodipine 10 mg daily, hydralazine 25 mg tid, metoprolol XL 50 mg daily, and spironolactone 50 mg daily  He also takes Seroquel 600 mg daily and carbamazepine 400 mg twice a day  He is also on testosterone 50 mg gel packet daily  For thyroid nodule, he denies any compressive neck symptoms  He has an updated thyroid US study to review  Review of Systems   Constitutional: Negative for diaphoresis and unexpected weight change  Eyes: Negative for visual disturbance  Neurological: Negative for weakness and headaches  All other systems reviewed and are negative  Historical Information   Past Medical History:   Diagnosis Date   • Disease of thyroid gland    • Hyperlipidemia    • Hypertension      Past Surgical History:   Procedure Laterality Date   • TONSILLECTOMY       Social History   Social History     Substance and Sexual Activity   Alcohol Use Not Currently     Social History     Substance and Sexual Activity   Drug Use Never     Social History     Tobacco Use   Smoking Status Former   • Types: Cigarettes   • Quit date:    • Years since quittin 4   Smokeless Tobacco Never     Family History: History reviewed  No pertinent family history      Meds/Allergies   Current Outpatient Medications   Medication Sig Dispense Refill   • amLODIPine "(NORVASC) 10 mg tablet Take 10 mg by mouth Daily     • aspirin 81 mg chewable tablet Chew 81 mg daily     • atorvastatin (LIPITOR) 10 mg tablet TAKE ONE TABLET BY MOUTH ONCE DAILY     • carBAMazepine (TEGretol) 200 mg tablet take 2 tablet by oral route  every 12 hours  (2 in the am & 2 in the pm)     • hydrALAZINE (APRESOLINE) 25 mg tablet Take 25 mg by mouth 3 (three) times a day     • metoprolol succinate (TOPROL-XL) 50 mg 24 hr tablet Take 50 mg by mouth daily     • QUEtiapine (SEROquel XR) 300 mg 24 hr tablet Take 300 mg by mouth Daily 2 tablets daily     • spironolactone (ALDACTONE) 50 mg tablet Take 50 mg by mouth daily     • Testosterone 50 MG PLLT 50 mg by Implant route in the morning     • lisinopril (ZESTRIL) 20 mg tablet Take 20 mg by mouth daily (Patient not taking: Reported on 4/21/2023)     • rivaroxaban (XARELTO) 10 mg tablet Take 1 tablet (10 mg total) by mouth daily with breakfast (Patient not taking: Reported on 12/27/2022) 30 tablet 0     No current facility-administered medications for this visit  Allergies   Allergen Reactions   • Losartan Anaphylaxis       Objective   Vitals: Blood pressure 160/100, pulse 69, height 6' 1\" (1 854 m), weight 122 kg (269 lb)  Physical Exam  Vitals reviewed  Constitutional:       Appearance: Normal appearance  Comments: No cushingoid features   HENT:      Head: Normocephalic and atraumatic  Nose: Nose normal    Eyes:      General: No scleral icterus  Conjunctiva/sclera: Conjunctivae normal    Neck:      Comments: Thick neck  Cardiovascular:      Rate and Rhythm: Normal rate and regular rhythm  Pulmonary:      Effort: Pulmonary effort is normal  No respiratory distress  Musculoskeletal:      Comments: Muscular physique   Skin:     General: Skin is warm and dry  Neurological:      General: No focal deficit present  Mental Status: He is alert        Comments: No tremor to outstretched hands   Psychiatric:         Behavior: Behavior " normal       Comments: Flat affect         The history was obtained from the review of the chart, patient  Lab Results:       LABS from [facility] (to be scanned into media)    4 27 23  BMP  Glucose 87  BUN 14  Cr 0 98  Na 140  K 4 6  Cl 104  CO2 28  Ca 9 3  Alb 4 3  TBili 0 2  AST 19  ALT 19  AG 8  eGFRcr 95    DHEAS 49    FT4 0 48 (0 61 - 1 12)  TSH 1 18     Hg 13 7  Hct 39 1  Plt 266    Metanephrines, plasma <0 1  Normetanephrine 0 43 (0 - 0 89)    [place] 9 8  Renin 0 5  ARR 19 6    24h urine (2100 cc)  Cr, ur 96 8 mg/dL  Cr, total 2 03 (0 8 - 2 8) g/24h  Metanphrn, ur 24h 76 (55 - 320)  Metanphrn ur (CRT) 37 (0-300)  Normet, ur 24h 399 (114 - 865)  Normet, ur (CRT) 194 (0-400)  Epin, ur per vol 3   Epin ur 24h 6 (1-14)  Dopamine , ur (CRT) 117 (0 - 250  Dopamine, ur 24h 242 (71 - 485)     Ref Range & Units 2/2/23 11:48 AM   Aldosterone ng/dL 11 3    Comment: (Note)   INTERPRETIVE INFORMATION: Aldosterone, Serum   Reference intervals for age 13 and older:   Upright     Paticia Hammans  4 0 - 31 0 ng/dL   Supine     [place] than or equal to 16 0 ng/dL   Unspecified   Paticia Hammans Paticia Hammans Paticia Hammans [place] than or equal to 31 0 ng/dL   Normal serum levels of aldosterone are dependent on the   sodium intake and whether the patient is upright or supine  High sodium intake will tend to suppress serum aldosterone,   whereas low sodium intake will elevate serum aldosterone  The reference intervals for serum aldosterone are based on   normal sodium intake  Access complete set of age- and/or gender-specific   reference intervals for this test in the [facility]   Test Directory ([facility])     Renin Activity ng/mL/h 0 3           Ref Range & Units 4/13/23 10:02 AM   Glucose 65 - 99 mg/dL 98    BUN 7 - 28 mg/dL 15    Creatinine 0 53 - 1 30 mg/dL 1 15    Sodium 135 - 145 mmol/L 142    Potassium 3 5 - 5 2 mmol/L 4 3    Chloride 100 - 109 mmol/L 105    Carbon Dioxide 21 - 31 mmol/L 26    Calcium 8 5 - 10 1 mg/dL 9 4    Anion Gap 3 - 11 11    eGFRcr >59 79       Ref Range & Units 23 11:48 AM   Aldosterone ng/dL 11 3    Comment: (Note)   INTERPRETIVE INFORMATION: Aldosterone, Serum   Reference intervals for age 13 and older:   Upright     Genevive Salmons  4 0 - 31 0 ng/dL   Supine     [place] than or equal to 16 0 ng/dL   Unspecified   Genevive Salmons Genevive Salmons Genevive Salmons [place] than or equal to 31 0 ng/dL   Normal serum levels of aldosterone are dependent on the   sodium intake and whether the patient is upright or supine  High sodium intake will tend to suppress serum aldosterone,   whereas low sodium intake will elevate serum aldosterone  The reference intervals for serum aldosterone are based on   normal sodium intake  Access complete set of age- and/or gender-specific   reference intervals for this test in the [facility]   Test Directory ([facility])  Renin Activity ng/mL/h 0 3    Comment: (Note)   INTERPRETIVE INFORMATION: Renin Activity   Adult, Normal sodium diet:    Supine     0 2-1 6 ng/mL/hr    Upright     0 5-4 0 ng/mL/hr   Children, Normal sodium diet, Supine:     (1-7 days)   Genevive Salmons   2 0-35 0 ng/mL/hr    Cord blood     4 0-32 0 ng/mL/hr    1-12 mos     2 4-37 0 ng/mL/hr    13 mos-3 yrs     1 7-11 2 ng/mL/hr    4-5 yrs     1 0- 6 5 ng/mL/hr    6-10 yrs     0 5- 5 9 ng/mL/hr    11-15 yrs     0 5- 3 3 ng/mL/hr   Children, normal sodium diet, Upright:    0-3 yrs     Not Available    4-5 yrs     Genevive Salmons Less than or equal to 15 ng/mL/hr    6-10 yrs     Genevive Salmons Less than or equal to 17 ng/mL/hr    11-15 yrs     Genevive Salmons Less than or equal to 16 ng/mL/hr   Plasma renin activity measures enzyme ability to convert   angiotensinogen to angiotensin I and is limited by the   availability of angiotensinogen  Plasma renin activity is   not an accurate indicator of enzyme activity when   angiotensinogen is decreased     This test was developed and its performance characteristics   determined by [facility]  It has not been cleared or   approved by the [employer] and Drug Administration  This test   was performed in a CLIA certified laboratory and is   intended for clinical purposes  Aldosterone/Renin Ratio <=25 0 RATIO 37 7 High       Ref Range & Units 2/2/23 11:48 AM   Glucose 65 - 99 mg/dL 105 High     BUN 7 - 28 mg/dL 11    Creatinine 0 53 - 1 30 mg/dL 1 11    Sodium 135 - 145 mmol/L 140    Potassium 3 5 - 5 2 mmol/L 4 4    Chloride 100 - 109 mmol/L 105    Carbon Dioxide 23 - 31 mmol/L 27    Calcium 8 5 - 10 1 mg/dL 9 2    Anion Gap 3 - 11 8    eGFRcr >59 82    eGFRcr Comment  Interpretive information: calculated GFR       Ref Range & Units 1/19/23 10:58 AM   Thyroid Stimulating Hormone 0 36 - 3 74 uIU/mL 0 88       Ref Range & Units 1/19/23 10:58 AM   T4, Free 0 76 - 1 46 ng/dL 0 65 Low             Imaging Studies:  ?   5 5 23    THYROID ULTRASOUND     INDICATION:    E04 1: Nontoxic single thyroid nodule      COMPARISON: January 12, 2019  TECHNIQUE:   Ultrasound of the thyroid was performed with a high frequency linear transducer in transverse and sagittal planes including volumetric imaging sweeps as well as traditional still imaging technique      FINDINGS:  Normal homogeneous smooth echotexture      Right lobe: 5 2 x 2 4 x 2 3 cm  Volume 13 6 mL  Left lobe:  4 3 x 1 9 x 1 9 cm  Volume 7 5 mL  Isthmus: 0 3  cm      Nodule #1  Image 28 series 64  Mid right thyroid lobe measuring 1 3 x 0 8 x 1 cm  Previously measuring 1 3 x 0 7 x 0 9  COMPOSITION:  2 points, solid or almost completely solid   ECHOGENICITY:  2 points, hypoechoic  SHAPE:  0 points, wider-than-tall  MARGIN: 2 points, lobulated or irregular  ECHOGENIC FOCI:  0 points, none or large comet-tail artifacts  TI-RADS Classification: TR 4 (4-6 points), FNA if > 1 5 cm  Follow if > 1cm , This nodule has been stable since previous study of May 14, 2016 4 about 7 years        Nodule #2    Image 29 series 1   Mid right measuring 0 6 x 0 9 x 0 7 cm  COMPOSITION:  1 point, mixed cystic and solid  ECHOGENICITY:  1 point, hyperechoic or isoechoic  SHAPE:  0 points, wider-than-tall  MARGIN: 0 points, smooth  ECHOGENIC FOCI:  0 points, none or large comet-tail artifacts  TI-RADS Classification: TR 2 (2 points), Not suspicious  No FNA      IMPRESSION:  The previously noted 1 3 x 0 8 x 1 cm nodule has been stable for 7 years       ?2 24 23    CT ABDOMEN AND PELVIS WITH AND WITHOUT IV CONTRAST     INDICATION:   E26 9: Hyperaldosteronism, unspecified      COMPARISON:  None      TECHNIQUE: Initial CT of the abdomen and pelvis was performed without intravenous contrast   Subsequent dynamic CT evaluation of the abdomen and pelvis was performed after the administration of intravenous contrast in both nephrographic and delayed   phases after the administration of intravenous contrast    Axial, sagittal, and coronal 2D reformatted images were created from the source data and submitted for interpretation       Radiation dose length product (DLP) for this visit:  3223 26 mGy-cm   This examination, like all CT scans performed in the [facility], was performed utilizing techniques to minimize radiation dose exposure, including the use of   iterative reconstruction and automated exposure control      IV Contrast:  100 mL of iohexol (OMNIPAQUE)  Enteric Contrast:  Enteric contrast was not administered      FINDINGS:     ABDOMEN     RIGHT KIDNEY AND URETER:  No solid renal mass  No detectable urothelial mass  No hydronephrosis or hydroureter  There are couple of nonobstructive punctate calculi in the lower pole (serious 2, image #76 and image #78)     LEFT KIDNEY AND URETER:  No solid renal mass  No detectable urothelial mass  No hydronephrosis or hydroureter  There is a punctate nonobstructive calculus in the interpolar region (series 2, image #59)     URINARY BLADDER:  No bladder wall mass    No calculi         LOWER CHEST:  There is a small calcified granuloma at the left lung base      LIVER/BILIARY TREE:  Unremarkable      GALLBLADDER:  No calcified gallstones  No pericholecystic inflammatory change      SPLEEN:  Unremarkable      PANCREAS:  Unremarkable      ADRENAL GLANDS:  The right adrenal gland is normal in appearance  There is a 7 mm nodule which appears to arise from the medial limb of the left adrenal gland (series 5, image #60)  This nodule measures 27 Hounsfield units on noncontrast CT, 79 Hounsfield units on portal venous phase and 49 Hounsfield units on 15 minute delay  Absolute washout is 57 7%  Relative washout is 38 0%      Given the above, there is left adrenal nodule is indeterminate in etiology      STOMACH AND BOWEL:  No bowel obstruction      APPENDIX:  No findings to suggest appendicitis      ABDOMINOPELVIC CAVITY:  No ascites  No free intraperitoneal air      There is infiltration of mesenteric fat surrounding the mesenteric vasculature with mild enlargement of mesenteric lymph nodes  For example, there is a 9 mm mesenteric lymph node (series 5, image #68)  There is a 1 1 cm mesenteric lymph node (image   #89)  There is a 7 mm mesenteric lymph node (image #101)     VESSELS:  There is a retroaortic left renal vein      PELVIS     REPRODUCTIVE ORGANS:  Unremarkable for patient's age      ABDOMINAL WALL/INGUINAL REGIONS:  There is a small fat-containing umbilical hernia      OSSEOUS STRUCTURES:  No acute fracture or destructive osseous lesion  Partially visualized calcifications in the left anterior proximal thigh      IMPRESSION:     1   7 mm left adrenal nodule, indeterminate in etiology  See contrast enhancement characteristics as described above  The contrast washout is not typical for an adrenal adenoma  Recommend attention on subsequent follow-up  2   Mesenteric fat infiltration (sushant mesentery) with mild mesenteric adenopathy    This is a nonspecific finding and can "be seen with mesenteric panniculitis; other etiologies are not excluded  Recommend follow-up CT abdomen and pelvis with contrast in 6 months  1 12 2019    THYROID ULTRASOUND     INDICATION:    E04 1: Nontoxic single thyroid nodule      COMPARISON:  None     TECHNIQUE:   Ultrasound of the thyroid was performed with a high frequency linear transducer in transverse and sagittal planes including volumetric imaging sweeps as well as traditional still imaging technique      FINDINGS:  Normal homogeneous smooth echotexture      Right lobe:  5 4 x 2 8 x 1 8 cm  Left lobe:  4 9 x 2 0 x 1 7 cm  Isthmus:  0 7 cm         Right upper thyroid pole nodule measuring 1 3 x 0 7 x 0 9 cm  COMPOSITION:  0 points, spongiform  ECHOGENICITY:  Not applicable when spongiform composition  SHAPE:  Not applicable when spongiform composition  MARGIN: 2 points, lobulated or irregular  ECHOGENIC FOCI:  Not applicable when spongiform composition  TI-RADS Classification: TR 2 (2 points), Not suspious  No FNA        Additional tiny right-sided nodules identified      IMPRESSION:     No nodule meets current ACR criteria for requiring biopsy but followup ultrasound is recommended in 1 year             I have personally reviewed pertinent reports  Portions of the record may have been created with voice recognition software  Occasional wrong word or \"sound a like\" substitutions may have occurred due to the inherent limitations of voice recognition software  Read the chart carefully and recognize, using context, where substitutions have occurred    "

## 2023-06-26 ENCOUNTER — TELEPHONE (OUTPATIENT)
Dept: ENDOCRINOLOGY | Facility: CLINIC | Age: 48
End: 2023-06-26

## 2023-06-26 NOTE — TELEPHONE ENCOUNTER
----- Message from Eula Martinez, DO sent at 6/13/2023  1:07 PM EDT -----  I have been unable to connect with patient by phone or mychart  Can he be tried again? [facility] did not process urine sample for cortisol or aldosterone, which I believe we should still follow up on  In lead up to that, should consume high sodium diet for 3 days (up to 6g per day total), or alternatively I can provide script for salt tabs, which he can start 2 days prior to urine collection and continue day of  Would recommend 24 hour urine collection for urine cortisol, creatinine, sodium, and aldosterone  He should still plan to have a CT scan of adrenal glands in 2-months  I'd like for him to return in 2-mo to go over those results   I will also discuss with him then retesting his thyroid labs, which were slightly abnormal  I suspect it may be medication related

## 2023-07-03 ENCOUNTER — TELEPHONE (OUTPATIENT)
Dept: ENDOCRINOLOGY | Facility: CLINIC | Age: 48
End: 2023-07-03

## 2023-07-03 DIAGNOSIS — E29.1 HYPOGONADISM IN MALE: Primary | ICD-10-CM

## 2023-07-03 RX ORDER — TESTOSTERONE GEL, 1% 10 MG/G
GEL TRANSDERMAL
COMMUNITY
Start: 2023-06-02 | End: 2023-07-10 | Stop reason: SDUPTHER

## 2023-07-03 NOTE — TELEPHONE ENCOUNTER
Please verify patient dose and strength on bottle. as per nephew and EMS patient has hx of dementia and was reported missing around 12pm and found around 4:15pm in her old residence. Pt's daughter was called and is on her way and states she wants her mother to be evaluated since she was in the street for hours alone.

## 2023-07-03 NOTE — TELEPHONE ENCOUNTER
----- Message from Germain Delgado sent at 7/3/2023  9:18 AM EDT -----  Left message for patient to call back   ----- Message -----  From: Michel Valverde DO  Sent: 6/13/2023   1:10 PM EDT  To: #    I have been unable to connect with patient by phone or mychart. Can he be tried again? [facility] labs did not process urine sample for cortisol or aldosterone, which I believe we should still follow up on. In lead up to that, should consume high sodium diet for 3 days (up to 6g per day total), or alternatively I can provide script for salt tabs, which he can start 2 days prior to urine collection and continue day of. Would recommend 24 hour urine collection for urine cortisol, creatinine, sodium, and aldosterone. He should still plan to have a CT scan of adrenal glands in 2-months. I'd like for him to return in 2-mo to go over those results.  I will also discuss with him then retesting his thyroid labs, which were slightly abnormal. I suspect it may be medication related.

## 2023-07-03 NOTE — TELEPHONE ENCOUNTER
Pt informed of the need to repeat urine test for cortisol & aldosterone. Pt advised to consume a high sodium diet and still have CT of adrenal glands in 2 months and follow up with an office visit.      Pt verbally understands

## 2023-07-07 ENCOUNTER — PATIENT MESSAGE (OUTPATIENT)
Dept: FAMILY MEDICINE CLINIC | Facility: CLINIC | Age: 48
End: 2023-07-07

## 2023-07-10 DIAGNOSIS — E29.1 HYPOGONADISM IN MALE: ICD-10-CM

## 2023-07-10 DIAGNOSIS — F32.A DEPRESSION, UNSPECIFIED DEPRESSION TYPE: Primary | ICD-10-CM

## 2023-07-10 RX ORDER — TESTOSTERONE GEL, 1% 10 MG/G
GEL TRANSDERMAL
Status: CANCELLED | OUTPATIENT
Start: 2023-07-10

## 2023-07-11 RX ORDER — QUETIAPINE 300 MG/1
600 TABLET, FILM COATED, EXTENDED RELEASE ORAL
Qty: 60 TABLET | Refills: 1 | Status: SHIPPED | OUTPATIENT
Start: 2023-07-11

## 2023-07-11 RX ORDER — TESTOSTERONE GEL, 1% 10 MG/G
50 GEL TRANSDERMAL DAILY
Qty: 150 PACKET | Refills: 0 | Status: SHIPPED | OUTPATIENT
Start: 2023-07-11

## 2023-07-19 DIAGNOSIS — I10 RESISTANT HYPERTENSION: Primary | ICD-10-CM

## 2023-07-19 RX ORDER — HYDRALAZINE HYDROCHLORIDE 25 MG/1
25 TABLET, FILM COATED ORAL 3 TIMES DAILY
Qty: 90 TABLET | Refills: 2 | Status: SHIPPED | OUTPATIENT
Start: 2023-07-19

## 2023-07-24 DIAGNOSIS — I15.9 SECONDARY HYPERTENSION: Primary | ICD-10-CM

## 2023-07-24 RX ORDER — AMLODIPINE BESYLATE 10 MG/1
10 TABLET ORAL DAILY
Qty: 90 TABLET | Refills: 3 | Status: SHIPPED | OUTPATIENT
Start: 2023-07-24

## 2023-07-24 RX ORDER — METOPROLOL SUCCINATE 50 MG/1
50 TABLET, EXTENDED RELEASE ORAL DAILY
Qty: 90 TABLET | Refills: 3 | Status: SHIPPED | OUTPATIENT
Start: 2023-07-24

## 2023-08-03 DIAGNOSIS — E29.1 HYPOGONADISM IN MALE: ICD-10-CM

## 2023-08-04 NOTE — TELEPHONE ENCOUNTER
Please inquire patient or patient's wife, he had 150 packets by the PDMP recently. This should be a 3-month supply. Let me know if there is any confusion or if this is not what happened.

## 2023-08-07 DIAGNOSIS — E29.1 HYPOGONADISM IN MALE: Primary | ICD-10-CM

## 2023-08-07 RX ORDER — TESTOSTERONE GEL, 1% 10 MG/G
50 GEL TRANSDERMAL DAILY
Qty: 90 PACKET | Refills: 3 | Status: SHIPPED | OUTPATIENT
Start: 2023-08-07

## 2023-08-07 RX ORDER — TESTOSTERONE GEL, 1% 10 MG/G
50 GEL TRANSDERMAL DAILY
Qty: 150 PACKET | Refills: 0 | OUTPATIENT
Start: 2023-08-07

## 2023-08-07 NOTE — TELEPHONE ENCOUNTER
Spoke to pt's wife states she spoke to you on Friday and showed you that he was only given a 1 month supply.

## 2023-08-28 DIAGNOSIS — F32.A DEPRESSION, UNSPECIFIED DEPRESSION TYPE: ICD-10-CM

## 2023-08-31 RX ORDER — QUETIAPINE 300 MG/1
600 TABLET, EXTENDED RELEASE ORAL
Qty: 60 TABLET | Refills: 0 | OUTPATIENT
Start: 2023-08-31

## 2023-09-05 DIAGNOSIS — F32.A DEPRESSION, UNSPECIFIED DEPRESSION TYPE: Primary | ICD-10-CM

## 2023-09-05 DIAGNOSIS — F32.A DEPRESSION, UNSPECIFIED DEPRESSION TYPE: ICD-10-CM

## 2023-09-05 RX ORDER — QUETIAPINE 300 MG/1
300 TABLET, EXTENDED RELEASE ORAL
Qty: 90 TABLET | Refills: 3 | Status: SHIPPED | OUTPATIENT
Start: 2023-09-05 | End: 2023-09-12 | Stop reason: SDUPTHER

## 2023-09-05 RX ORDER — QUETIAPINE 300 MG/1
600 TABLET, FILM COATED, EXTENDED RELEASE ORAL
Qty: 60 TABLET | Refills: 1 | OUTPATIENT
Start: 2023-09-05

## 2023-09-12 ENCOUNTER — TELEPHONE (OUTPATIENT)
Dept: FAMILY MEDICINE CLINIC | Facility: CLINIC | Age: 48
End: 2023-09-12

## 2023-09-12 DIAGNOSIS — F32.A DEPRESSION, UNSPECIFIED DEPRESSION TYPE: ICD-10-CM

## 2023-09-12 RX ORDER — QUETIAPINE 300 MG/1
600 TABLET, EXTENDED RELEASE ORAL
Qty: 180 TABLET | Refills: 3 | Status: SHIPPED | OUTPATIENT
Start: 2023-09-12

## 2023-09-12 NOTE — TELEPHONE ENCOUNTER
Wife called to say that Ted's seroquel was sent in incorrectly. She said he takes 2 tablets not 1. She said he will run out early.  Yann Lozano can be reached at [phone]

## 2023-10-23 DIAGNOSIS — I1A.0 RESISTANT HYPERTENSION: ICD-10-CM

## 2023-10-23 RX ORDER — HYDRALAZINE HYDROCHLORIDE 25 MG/1
25 TABLET, FILM COATED ORAL 3 TIMES DAILY
Qty: 90 TABLET | Refills: 2
Start: 2023-10-23 | End: 2023-10-26 | Stop reason: SDUPTHER

## 2023-10-23 RX ORDER — SPIRONOLACTONE 50 MG/1
50 TABLET, FILM COATED ORAL DAILY
Qty: 90 TABLET | Refills: 1
Start: 2023-10-23 | End: 2023-10-26 | Stop reason: SDUPTHER

## 2023-10-25 ENCOUNTER — HOSPITAL ENCOUNTER (OUTPATIENT)
Dept: CT IMAGING | Facility: HOSPITAL | Age: 48
Discharge: HOME | End: 2023-10-25
Attending: STUDENT IN AN ORGANIZED HEALTH CARE EDUCATION/TRAINING PROGRAM
Payer: COMMERCIAL

## 2023-10-25 DIAGNOSIS — E27.8 ADRENAL NODULE (HCC): ICD-10-CM

## 2023-10-25 PROCEDURE — G1004 CDSM NDSC: HCPCS

## 2023-10-25 PROCEDURE — 74170 CT ABD WO CNTRST FLWD CNTRST: CPT

## 2023-10-25 RX ADMIN — IOHEXOL 100 ML: 350 INJECTION, SOLUTION INTRAVENOUS at 10:44

## 2023-10-26 DIAGNOSIS — I1A.0 RESISTANT HYPERTENSION: ICD-10-CM

## 2023-10-26 RX ORDER — SPIRONOLACTONE 50 MG/1
50 TABLET, FILM COATED ORAL DAILY
Qty: 90 TABLET | Refills: 1 | Status: SHIPPED | OUTPATIENT
Start: 2023-10-26

## 2023-10-26 RX ORDER — HYDRALAZINE HYDROCHLORIDE 25 MG/1
25 TABLET, FILM COATED ORAL 3 TIMES DAILY
Qty: 90 TABLET | Refills: 1 | Status: SHIPPED | OUTPATIENT
Start: 2023-10-26

## 2023-11-02 DIAGNOSIS — R93.7 ABNORMAL COMPUTED TOMOGRAPHY OF LUMBAR SPINE: Primary | ICD-10-CM

## 2023-11-13 ENCOUNTER — HOSPITAL ENCOUNTER (OUTPATIENT)
Dept: MRI IMAGING | Facility: HOSPITAL | Age: 48
Discharge: HOME | End: 2023-11-13
Attending: STUDENT IN AN ORGANIZED HEALTH CARE EDUCATION/TRAINING PROGRAM
Payer: COMMERCIAL

## 2023-11-13 DIAGNOSIS — R93.7 ABNORMAL COMPUTED TOMOGRAPHY OF LUMBAR SPINE: ICD-10-CM

## 2023-11-13 PROCEDURE — 72158 MRI LUMBAR SPINE W/O & W/DYE: CPT

## 2023-11-13 PROCEDURE — A9585 GADOBUTROL INJECTION: HCPCS | Performed by: STUDENT IN AN ORGANIZED HEALTH CARE EDUCATION/TRAINING PROGRAM

## 2023-11-13 PROCEDURE — G1004 CDSM NDSC: HCPCS

## 2023-11-13 RX ORDER — GADOBUTROL 604.72 MG/ML
11 INJECTION INTRAVENOUS
Status: COMPLETED | OUTPATIENT
Start: 2023-11-13 | End: 2023-11-13

## 2023-11-13 RX ADMIN — GADOBUTROL 11 ML: 604.72 INJECTION INTRAVENOUS at 16:45

## 2023-11-16 DIAGNOSIS — E78.2 MIXED HYPERLIPIDEMIA: Primary | ICD-10-CM

## 2023-11-16 RX ORDER — ATORVASTATIN CALCIUM 10 MG/1
10 TABLET, FILM COATED ORAL DAILY
Qty: 90 TABLET | Refills: 3 | Status: SHIPPED | OUTPATIENT
Start: 2023-11-16

## 2023-11-17 DIAGNOSIS — F32.A DEPRESSION, UNSPECIFIED DEPRESSION TYPE: Primary | ICD-10-CM

## 2023-11-17 RX ORDER — CARBAMAZEPINE 200 MG/1
TABLET ORAL
Qty: 120 TABLET | Refills: 0 | Status: SHIPPED | OUTPATIENT
Start: 2023-11-17

## 2023-11-17 NOTE — TELEPHONE ENCOUNTER
Patient's wife called states he is out of medication due to [facility] not having in stock. Patient needs script sent to [facility] in [facility].

## 2023-12-11 ENCOUNTER — OFFICE VISIT (OUTPATIENT)
Dept: ENDOCRINOLOGY | Facility: CLINIC | Age: 48
End: 2023-12-11
Payer: COMMERCIAL

## 2023-12-11 VITALS
HEART RATE: 73 BPM | HEIGHT: 73 IN | WEIGHT: 256 LBS | SYSTOLIC BLOOD PRESSURE: 140 MMHG | DIASTOLIC BLOOD PRESSURE: 90 MMHG | BODY MASS INDEX: 33.93 KG/M2 | OXYGEN SATURATION: 97 %

## 2023-12-11 DIAGNOSIS — R94.6 ABNORMAL THYROID FUNCTION TEST: ICD-10-CM

## 2023-12-11 DIAGNOSIS — I10 HYPERTENSION, UNSPECIFIED TYPE: ICD-10-CM

## 2023-12-11 DIAGNOSIS — E27.8 ADRENAL NODULE (HCC): Primary | ICD-10-CM

## 2023-12-11 DIAGNOSIS — R93.7 ABNORMAL MRI, LUMBAR SPINE: ICD-10-CM

## 2023-12-11 DIAGNOSIS — G47.33 OSA (OBSTRUCTIVE SLEEP APNEA): ICD-10-CM

## 2023-12-11 PROCEDURE — 99214 OFFICE O/P EST MOD 30 MIN: CPT | Performed by: STUDENT IN AN ORGANIZED HEALTH CARE EDUCATION/TRAINING PROGRAM

## 2023-12-11 NOTE — PROGRESS NOTES
Spencer Casillas 50 y.o. male MRN: [id number]    Encounter: [id number]      Assessment/Plan     1. Adrenal nodule - indeterminate left sided adrenal nodule 0.7 cm un-enhanced HU 27, absolute washout 57.7%, relative washout 38%. Positive case detection for hyperaldosteronism with suppressed renin <1, tanya >10. 24h urine aldosterone <12, and 24h urine creatinine is in normal limits. A 24h urine sodium was requested, but was not included in measurement. Despite this, I consider the urinary tanya result to be mostly reassuring of appropriate aldosterone suppression. No history of spontaneous hypokalemia. 24h urine cortisol wnl. Will plan for monitoring strategy for now. Repeat renin/tanya labs and CTAP adrenal protocol recommended for 12-mo     2. Bipolar d/o - on carbamazepine and seroquel. Carbamazepine is a CY inducer. As such, would defer ODST due to potential for false positive result. 3. Thyroid nodule - stable findings on thyroid US extending period of 7 years. No indication for repeat studies. 4. Hypertension - fair control. Continue ambulatory BP monitoring. Continue current Rx, consider intensification of spironolactone as needed    5. Current use of testosterone - this can also be a cause of high blood pressure    6. JOLYNN - this may also contribute to uncontrolled hypertension. Sandeep Harry is non-compliant with CPAP. Discussed adverse cardiometabolic outcomes of untreated JOLYNN. Will refer to sleep medicine for follow up evaluation    7. Abnormal thyroid function - thyroid function tests are in normal limits. Will monitor     8. Abnormal MRI lumbar spine - probable small cyst in superior endplate of L5, enlarged since 23.  Will plan 6-mo follow up MRI to ensure stability    Problem List Items Addressed This Visit    None  Visit Diagnoses     Adrenal nodule ([address])    -  Primary    Relevant Orders    Aldosterone/Renin Ratio    Basic metabolic panel Lab Collect    CT abdomen w wo contrast    Abnormal MRI, lumbar spine        Relevant Orders    MRI lumbar spine w wo contrast    Hypertension, unspecified type        Relevant Orders    Aldosterone/Renin Ratio    Basic metabolic panel Lab Collect    Abnormal thyroid function test        Relevant Orders    T4, free Lab Collect    TSH, 3rd generation Lab Collect    JOLYNN (obstructive sleep apnea)            RTC 12-mo    CC: adrenal nodule, thyroid nodule    History of Present Illness     HPI:    Joellen Cooper returns today in follow up adrenal nodule, hypertension. No acute concerns today, chiefly here to review labs and imaging. Reports ambulatory BP high. Checks 2-3x weekly. SBP can be >160. No other symptoms. He completed 24h urine test in Oct. He confirmed high salt intake prior to study. For hypertension, he takes amlodipine 10 mg daily, hydralazine 25 mg tid, metoprolol XL 50 mg daily, and spironolactone 50 mg daily. He also takes Seroquel 600 mg daily and carbamazepine 400 mg twice a day. He is also on testosterone 50 mg gel packet daily. Review of Systems   Constitutional:  Negative for diaphoresis and unexpected weight change. Eyes:  Negative for visual disturbance. Neurological:  Negative for weakness and headaches. All other systems reviewed and are negative. Historical Information   Past Medical History:   Diagnosis Date   • Disease of thyroid gland    • Hyperlipidemia    • Hypertension      Past Surgical History:   Procedure Laterality Date   • TONSILLECTOMY       Social History   Social History     Substance and Sexual Activity   Alcohol Use Not Currently     Social History     Substance and Sexual Activity   Drug Use Never     Social History     Tobacco Use   Smoking Status Former   • Types: Cigarettes   • Quit date:    • Years since quittin.9   Smokeless Tobacco Never     Family History: History reviewed. No pertinent family history.     Meds/Allergies   Current Outpatient Medications   Medication Sig Dispense Refill   • amLODIPine (NORVASC) 10 mg tablet Take 1 tablet (10 mg total) by mouth daily 90 tablet 3   • aspirin 81 mg chewable tablet Chew 81 mg daily     • atorvastatin (LIPITOR) 10 mg tablet Take 1 tablet (10 mg total) by mouth daily 90 tablet 3   • carBAMazepine (TEGretol) 200 mg tablet 2 tablets in AM and 2 tablets in the  tablet 0   • hydrALAZINE (APRESOLINE) 25 mg tablet Take 1 tablet (25 mg total) by mouth 3 (three) times a day 90 tablet 1   • metoprolol succinate (TOPROL-XL) 50 mg 24 hr tablet Take 1 tablet (50 mg total) by mouth daily 90 tablet 3   • SEROquel  MG 24 hr tablet Take 2 tablets (600 mg total) by mouth daily at bedtime 180 tablet 3   • spironolactone (ALDACTONE) 50 mg tablet Take 1 tablet (50 mg total) by mouth daily 90 tablet 1   • testosterone (ANDROGEL) 1% Apply 1 packet (50 mg total) topically daily 150 packet 0   • testosterone (ANDROGEL) 1% Apply 1 packet (50 mg total) topically daily 90 packet 3   • lisinopril (ZESTRIL) 20 mg tablet Take 20 mg by mouth daily (Patient not taking: Reported on 4/21/2023)       No current facility-administered medications for this visit. Allergies   Allergen Reactions   • Losartan Anaphylaxis       Objective   Vitals: Blood pressure 140/90, pulse 73, height 6' 1" (1.854 m), weight 116 kg (256 lb), SpO2 97 %. Physical Exam  Vitals reviewed. Constitutional:       Appearance: Normal appearance. HENT:      Head: Normocephalic and atraumatic. Nose: Nose normal.   Eyes:      General: No scleral icterus. Conjunctiva/sclera: Conjunctivae normal.   Neck:      Comments: Thick neck  Cardiovascular:      Rate and Rhythm: Normal rate and regular rhythm. Pulmonary:      Effort: Pulmonary effort is normal. No respiratory distress. Musculoskeletal:      Comments: Muscular physique   Skin:     General: Skin is warm and dry. Neurological:      General: No focal deficit present. Mental Status: He is alert.    Psychiatric:         Behavior: Behavior normal.         The history was obtained from the review of the chart, patient. Lab Results:       Component  Ref Range & Units 10/21/23  9:09 AM   Thyroid Stimulating Hormone  0.45 - 5.33 uIU/mL 2.57     Component  Ref Range & Units 10/21/23  9:09 AM   T4, Free  0.61 - 1.12 ng/dL 0.76     ALDOSTERONE, URINE  Order: [id number]  Component  Ref Range & Units 10/20/23  9:00 AM   Hours Collected  hr 24   Comment: (Note)  Per 24h calculations are provided to aid interpretation for  collections with a duration of 24 hours and an average  daily urine volume. For specimens with notable deviations  in collection time or volume, ratios of analytes to a  corresponding urine creatinine concentration may assist in  result interpretation. Total Volume  mL 2,200   Aldosterone, UR  1.2 - 28.1 ug/d 6.7   Comment: (Note)  Test performed on a sub-optimal sample per client request.    Specimen is UNPRESERVED. The reference interval does not  apply. Creatinine, Urine per volume  mg/dL 81   Creatinine, Urine 24h  1000 - 2500 mg/d 1,782   Comment: (Note)  Performed By: [facility] [address]  : Tino Camejo MD, PhD  CLIA Number: [id number]     Component  Ref Range & Units 10/20/23  9:00 AM   Hours Collected  hr 24   Comment: (Note)  Per 24h calculations are provided to aid interpretation for  collections with a duration of 24 hours and an average  daily urine volume. For specimens with notable deviations  in collection time or volume, ratios of analytes to a  corresponding urine creatinine concentration may assist in  result interpretation.    Total Volume  mL 2,200   Cortisol, Urine per volume  ug/L 8.63   Cortisol Ratio CRT  ug/g CRT 10.52   Comment: (Note)  Reference Interval: Cortisol ug/g crt  Female  Prepubertal: Less than 25 ug/g crt  18 years and older: Less than 24 ug/g crt  Pregnancy: Less than 59 ug/g crt  Male  Prepubertal: Less than 25 ug/g crt  18 years and older: Less than 32 ug/g crt   Cortisol, Urine Per 24h  <=60.0 ug/d 19.0   Creatinine, Urine per volume  mg/dL 82   Creatinine, Urine 24h  1000 - 2500 mg/d 1,804   Cortisol, Urine Interpretation SEE NOTES   Comment: See Note  (Note)  INTERPRETIVE INFORMATION: Cortisol Urine Free by LC-MS/MS  Access complete set of age- and/or gender-specific  reference intervals for this test in the [facility]  Test Directory ([employer]). This test was developed and its performance characteristics  determined by [facility]. It has not been cleared or  approved by the [employer] and Drug Administration. This test  was performed in a CLIA certified laboratory and is  intended for clinical purposes. Performed By: [facility]  [address] [place] [address], [address] [place] [address]  : Lola Miranda MD, PhD  CLIA Number: [id number]       LABS from [facility] (to be scanned into media)    4.27.23  BMP  Glucose 87  BUN 14  Cr 0.98  Na 140  K 4.6  Cl 104  CO2 28  Ca 9.3  Alb 4.3  TBili 0.2  AST 19  ALT 19  AG 8  eGFRcr 95    DHEAS 49    FT4 0.48 (0.61 - 1.12)  TSH 1.18     Hg 13.7  Hct 39.1  Plt 266    Metanephrines, plasma <0.1  Normetanephrine 0.43 (0 - 0.89)    Navarro 9.8  Renin 0.5  ARR 19.6    24h urine (2100 cc)  Cr, ur 96.8 mg/dL  Cr, total 2.03 (0.8 - 2.8) g/24h  Metanphrn, ur 24h 76 (55 - 320)  Metanphrn ur (CRT) 37 (0-300)  Normet, ur 24h 399 (114 - 865)  Normet, ur (CRT) 194 (0-400)  Epin, ur per vol 3   Epin ur 24h 6 (1-14)  Dopamine , ur (CRT) 117 (0 - 250  Dopamine, ur 24h 242 (71 - 485)     Ref Range & Units 2/2/23 11:48 AM   Aldosterone ng/dL 11.3    Comment: (Note)   INTERPRETIVE INFORMATION: Aldosterone, Serum   Reference intervals for age 13 and older:   Upright . ........  4.0 - 31.0 ng/dL   Supine . ........ Chrissy Marinas Less than or equal to 16.0 ng/dL   Unspecified . Chrissy Marinas ...   Less than or equal to 31.0 ng/dL   Normal serum levels of aldosterone are dependent on the   sodium intake and whether the patient is upright or supine. High sodium intake will tend to suppress serum aldosterone,   whereas low sodium intake will elevate serum aldosterone. The reference intervals for serum aldosterone are based on   normal sodium intake. Access complete set of age- and/or gender-specific   reference intervals for this test in the [facility]   Test Directory ([employer]). Renin Activity ng/mL/h 0.3           Ref Range & Units 23 10:02 AM   Glucose 65 - 99 mg/dL 98    BUN 7 - 28 mg/dL 15    Creatinine 0.53 - 1.30 mg/dL 1.15    Sodium 135 - 145 mmol/L 142    Potassium 3.5 - 5.2 mmol/L 4.3    Chloride 100 - 109 mmol/L 105    Carbon Dioxide 21 - 31 mmol/L 26    Calcium 8.5 - 10.1 mg/dL 9.4    Anion Gap 3 - 11 11    eGFRcr >59 79       Ref Range & Units 23 11:48 AM   Aldosterone ng/dL 11.3    Comment: (Note)   INTERPRETIVE INFORMATION: Aldosterone, Serum   Reference intervals for age 13 and older:   Upright . ........  4.0 - 31.0 ng/dL   Supine . ........ Chrissy Marinas Less than or equal to 16.0 ng/dL   Unspecified . Chrissy Marinas ... Less than or equal to 31.0 ng/dL   Normal serum levels of aldosterone are dependent on the   sodium intake and whether the patient is upright or supine. High sodium intake will tend to suppress serum aldosterone,   whereas low sodium intake will elevate serum aldosterone. The reference intervals for serum aldosterone are based on   normal sodium intake. Access complete set of age- and/or gender-specific   reference intervals for this test in the [facility]   Test Directory ([employer]). Renin Activity ng/mL/h 0.3    Comment: (Note)   INTERPRETIVE INFORMATION: Renin Activity   Adult, Normal sodium diet:    Supine . ................ 0.2-1.6 ng/mL/hr    Upright . ............... 0.5-4.0 ng/mL/hr   Children, Normal sodium diet, Supine:    [place] (1-7 days) . Chrissy Marinas ... 2.0-35.0 ng/mL/hr    Cord blood . ............ 4.0-32.0 ng/mL/hr    1-12 mos . .............. 2.4-37.0 ng/mL/hr    13 mos-3 yrs Iván Speaker Iván Speaker Iván Speaker ........ 1.7-11.2 ng/mL/hr    4-5 yrs . ............... 1.0- 6.5 ng/mL/hr    6-10 yrs . .............. 0.5- 5.9 ng/mL/hr    11-15 yrs . ............. 0.5- 3.3 ng/mL/hr   Children, normal sodium diet, Upright:    0-3 yrs . ............... Not Available    4-5 yrs . .............. Iván Speaker Less than or equal to 15 ng/mL/hr    6-10 yrs . ............. Iván Speaker Less than or equal to 17 ng/mL/hr    11-15 yrs . ............ Iván Speaker Less than or equal to 16 ng/mL/hr   Plasma renin activity measures enzyme ability to convert   angiotensinogen to angiotensin I and is limited by the   availability of angiotensinogen. Plasma renin activity is   not an accurate indicator of enzyme activity when   angiotensinogen is decreased. This test was developed and its performance characteristics   determined by [facility]. It has not been cleared or   approved by the [employer] and Drug Administration. This test   was performed in a CLIA certified laboratory and is   intended for clinical purposes. Aldosterone/Renin Ratio <=25.0 RATIO 37.7 High       Ref Range & Units 2/2/23 11:48 AM   Glucose 65 - 99 mg/dL 105 High     BUN 7 - 28 mg/dL 11    Creatinine 0.53 - 1.30 mg/dL 1.11    Sodium 135 - 145 mmol/L 140    Potassium 3.5 - 5.2 mmol/L 4.4    Chloride 100 - 109 mmol/L 105    Carbon Dioxide 23 - 31 mmol/L 27    Calcium 8.5 - 10.1 mg/dL 9.2    Anion Gap 3 - 11 8    eGFRcr >59 82    eGFRcr Comment  Interpretive information: calculated GFR       Ref Range & Units 1/19/23 10:58 AM   Thyroid Stimulating Hormone 0.36 - 3.74 uIU/mL 0.88       Ref Range & Units 1/19/23 10:58 AM   T4, Free 0.76 - 1.46 ng/dL 0.65 Low             Imaging Studies:  ?   5.5.23    THYROID ULTRASOUND     INDICATION:    E04.1: Nontoxic single thyroid nodule.      COMPARISON: January 12, 2019  TECHNIQUE:   Ultrasound of the thyroid was performed with a high frequency linear transducer in transverse and sagittal planes including volumetric imaging sweeps as well as traditional still imaging technique. FINDINGS:  Normal homogeneous smooth echotexture. Right lobe: 5.2 x 2.4 x 2.3 cm. Volume 13.6 mL  Left lobe:  4.3 x 1.9 x 1.9 cm. Volume 7.5 mL  Isthmus: 0.3  cm. Nodule #1. Image 28 series 64. Mid right thyroid lobe measuring 1.3 x 0.8 x 1 cm. Previously measuring 1.3 x 0.7 x 0.9  COMPOSITION:  2 points, solid or almost completely solid . ECHOGENICITY:  2 points, hypoechoic. SHAPE:  0 points, wider-than-tall. MARGIN: 2 points, lobulated or irregular. ECHOGENIC FOCI:  0 points, none or large comet-tail artifacts. TI-RADS Classification: TR 4 (4-6 points), FNA if > 1.5 cm. Follow if > 1cm., This nodule has been stable since previous study of May 14, 2016 4 about 7 years        Nodule #2. Image 29 series 1. Mid right measuring 0.6 x 0.9 x 0.7 cm. COMPOSITION:  1 point, mixed cystic and solid. ECHOGENICITY:  1 point, hyperechoic or isoechoic. SHAPE:  0 points, wider-than-tall. MARGIN: 0 points, smooth. ECHOGENIC FOCI:  0 points, none or large comet-tail artifacts. TI-RADS Classification: TR 2 (2 points), Not suspicious. No FNA. IMPRESSION:  The previously noted 1.3 x 0.8 x 1 cm nodule has been stable for 7 years       ? 2.24.23    CT ABDOMEN AND PELVIS WITH AND WITHOUT IV CONTRAST     INDICATION:   E26.9: Hyperaldosteronism, unspecified. COMPARISON:  None. TECHNIQUE: Initial CT of the abdomen and pelvis was performed without intravenous contrast.  Subsequent dynamic CT evaluation of the abdomen and pelvis was performed after the administration of intravenous contrast in both nephrographic and delayed   phases after the administration of intravenous contrast.   Axial, sagittal, and coronal 2D reformatted images were created from the source data and submitted for interpretation. Radiation dose length product (DLP) for this visit:  3223.26 mGy-cm .   This examination, like all CT scans performed in the [facility], was performed utilizing techniques to minimize radiation dose exposure, including the use of   iterative reconstruction and automated exposure control. IV Contrast:  100 mL of iohexol (OMNIPAQUE)  Enteric Contrast:  Enteric contrast was not administered. FINDINGS:     ABDOMEN     RIGHT KIDNEY AND URETER:  No solid renal mass. No detectable urothelial mass. No hydronephrosis or hydroureter. There are couple of nonobstructive punctate calculi in the lower pole (serious 2, image #76 and image #78). LEFT KIDNEY AND URETER:  No solid renal mass. No detectable urothelial mass. No hydronephrosis or hydroureter. There is a punctate nonobstructive calculus in the interpolar region (series 2, image #59). URINARY BLADDER:  No bladder wall mass. No calculi. LOWER CHEST:  There is a small calcified granuloma at the left lung base. LIVER/BILIARY TREE:  Unremarkable. GALLBLADDER:  No calcified gallstones. No pericholecystic inflammatory change. SPLEEN:  Unremarkable. PANCREAS:  Unremarkable. ADRENAL GLANDS:  The right adrenal gland is normal in appearance. There is a 7 mm nodule which appears to arise from the medial limb of the left adrenal gland (series 5, image #60). This nodule measures 27 Hounsfield units on noncontrast CT, 79 Hounsfield units on portal venous phase and 49 Hounsfield units on 15 minute delay. Absolute washout is 57.7%  Relative washout is 38.0%. Given the above, there is left adrenal nodule is indeterminate in etiology. STOMACH AND BOWEL:  No bowel obstruction. APPENDIX:  No findings to suggest appendicitis. ABDOMINOPELVIC CAVITY:  No ascites. No free intraperitoneal air. There is infiltration of mesenteric fat surrounding the mesenteric vasculature with mild enlargement of mesenteric lymph nodes. For example, there is a 9 mm mesenteric lymph node (series 5, image #68). There is a 1.1 cm mesenteric lymph node (image   #89).   There is a 7 mm mesenteric lymph node (image #101).     VESSELS:  There is a retroaortic left renal vein. PELVIS     REPRODUCTIVE ORGANS:  Unremarkable for patient's age. ABDOMINAL WALL/INGUINAL REGIONS:  There is a small fat-containing umbilical hernia. OSSEOUS STRUCTURES:  No acute fracture or destructive osseous lesion. Partially visualized calcifications in the left anterior proximal thigh. IMPRESSION:     1.  7 mm left adrenal nodule, indeterminate in etiology. See contrast enhancement characteristics as described above. The contrast washout is not typical for an adrenal adenoma. Recommend attention on subsequent follow-up. 2.  Mesenteric fat infiltration (sushant mesentery) with mild mesenteric adenopathy. This is a nonspecific finding and can be seen with mesenteric panniculitis; other etiologies are not excluded. Recommend follow-up CT abdomen and pelvis with contrast in 6 months. 1.12.2019    THYROID ULTRASOUND     INDICATION:    E04.1: Nontoxic single thyroid nodule. COMPARISON:  None     TECHNIQUE:   Ultrasound of the thyroid was performed with a high frequency linear transducer in transverse and sagittal planes including volumetric imaging sweeps as well as traditional still imaging technique. FINDINGS:  Normal homogeneous smooth echotexture. Right lobe:  5.4 x 2.8 x 1.8 cm. Left lobe:  4.9 x 2.0 x 1.7 cm. Isthmus:  0.7 cm. Right upper thyroid pole nodule measuring 1.3 x 0.7 x 0.9 cm. COMPOSITION:  0 points, spongiform. ECHOGENICITY:  Not applicable when spongiform composition. SHAPE:  Not applicable when spongiform composition. MARGIN: 2 points, lobulated or irregular. ECHOGENIC FOCI:  Not applicable when spongiform composition. TI-RADS Classification: TR 2 (2 points), Not suspious. No FNA. Additional tiny right-sided nodules identified. IMPRESSION:     No nodule meets current ACR criteria for requiring biopsy but followup ultrasound is recommended in 1 year.             I have personally reviewed pertinent reports. Portions of the record may have been created with voice recognition software. Occasional wrong word or "sound a like" substitutions may have occurred due to the inherent limitations of voice recognition software. Read the chart carefully and recognize, using context, where substitutions have occurred.

## 2024-01-08 DIAGNOSIS — I1A.0 RESISTANT HYPERTENSION: ICD-10-CM

## 2024-01-08 RX ORDER — HYDRALAZINE HYDROCHLORIDE 25 MG/1
25 TABLET, FILM COATED ORAL 3 TIMES DAILY
Qty: 90 TABLET | Refills: 3 | Status: SHIPPED | OUTPATIENT
Start: 2024-01-08

## 2024-02-01 ENCOUNTER — TELEPHONE (OUTPATIENT)
Dept: FAMILY MEDICINE CLINIC | Facility: CLINIC | Age: 49
End: 2024-02-01

## 2024-02-07 DIAGNOSIS — E29.1 HYPOGONADISM IN MALE: ICD-10-CM

## 2024-02-07 NOTE — TELEPHONE ENCOUNTER
Please call either patient or his wife, figure out how many packets he puts on daily.  150 packets in 30 days which suggest 5 packets/day which seems like a lot.  Thank you

## 2024-02-08 RX ORDER — TESTOSTERONE GEL, 1% 10 MG/G
GEL TRANSDERMAL
Qty: 150 G | Refills: 0 | Status: SHIPPED | OUTPATIENT
Start: 2024-02-08

## 2024-02-09 ENCOUNTER — TELEPHONE (OUTPATIENT)
Dept: ADMINISTRATIVE | Facility: OTHER | Age: 49
End: 2024-02-09

## 2024-02-09 NOTE — TELEPHONE ENCOUNTER
----- Message from Federica Chu sent at 2/9/2024 11:35 AM EST -----  Regarding: HIV  02/09/24 11:35 AM    Hello, our patient Ted Morales has had HIV completed/performed. Please assist in updating the patient chart by pulling the Care Everywhere (CE) document. The date of service is 02/09/2007.     Thank you,  Federica [facility]

## 2024-02-09 NOTE — TELEPHONE ENCOUNTER
Upon review of the In Basket request we were able to locate, review, and update the patient chart as requested for HIV.    Any additional questions or concerns should be emailed to the Practice Liaisons via the appropriate education email address, please do not reply via In Basket.    Thank you  DANNY MONDRAGON

## 2024-02-29 DIAGNOSIS — F32.A DEPRESSION, UNSPECIFIED DEPRESSION TYPE: ICD-10-CM

## 2024-02-29 RX ORDER — CARBAMAZEPINE 200 MG/1
TABLET ORAL
Qty: 360 TABLET | Refills: 3 | Status: SHIPPED | OUTPATIENT
Start: 2024-02-29

## 2024-03-08 ENCOUNTER — OFFICE VISIT (OUTPATIENT)
Dept: FAMILY MEDICINE CLINIC | Facility: CLINIC | Age: 49
End: 2024-03-08
Payer: COMMERCIAL

## 2024-03-08 VITALS
HEART RATE: 59 BPM | OXYGEN SATURATION: 97 % | HEIGHT: 72 IN | DIASTOLIC BLOOD PRESSURE: 88 MMHG | BODY MASS INDEX: 35.43 KG/M2 | WEIGHT: 261.6 LBS | SYSTOLIC BLOOD PRESSURE: 138 MMHG | TEMPERATURE: 97.9 F

## 2024-03-08 DIAGNOSIS — Z11.59 NEED FOR HEPATITIS C SCREENING TEST: ICD-10-CM

## 2024-03-08 DIAGNOSIS — I1A.0 RESISTANT HYPERTENSION: ICD-10-CM

## 2024-03-08 DIAGNOSIS — F31.78 BIPOLAR DISORDER, IN FULL REMISSION, MOST RECENT EPISODE MIXED (HCC): ICD-10-CM

## 2024-03-08 DIAGNOSIS — Z12.5 SCREENING FOR PROSTATE CANCER: ICD-10-CM

## 2024-03-08 DIAGNOSIS — Z12.11 SCREENING FOR COLON CANCER: ICD-10-CM

## 2024-03-08 DIAGNOSIS — E27.8 ADRENAL NODULE (HCC): ICD-10-CM

## 2024-03-08 DIAGNOSIS — Z00.00 WELLNESS EXAMINATION: Primary | ICD-10-CM

## 2024-03-08 DIAGNOSIS — E29.1 HYPOGONADISM IN MALE: ICD-10-CM

## 2024-03-08 DIAGNOSIS — E78.2 MIXED HYPERLIPIDEMIA: ICD-10-CM

## 2024-03-08 PROBLEM — E27.9 ADRENAL NODULE (HCC): Status: ACTIVE | Noted: 2024-03-08

## 2024-03-08 PROCEDURE — 99386 PREV VISIT NEW AGE 40-64: CPT | Performed by: INTERNAL MEDICINE

## 2024-03-08 RX ORDER — HYDRALAZINE HYDROCHLORIDE 50 MG/1
50 TABLET, FILM COATED ORAL 3 TIMES DAILY
Qty: 90 TABLET | Refills: 3 | Status: SHIPPED | OUTPATIENT
Start: 2024-03-08

## 2024-03-08 NOTE — ASSESSMENT & PLAN NOTE
Think this is not adequately controlled at this time.  Will increase his hydralazine from 25 to 50 mg 3 times daily.  Continue to monitor at home.  Notably his allergy to ACE inhibitors and ARB's, causing anaphylaxis.

## 2024-03-08 NOTE — ASSESSMENT & PLAN NOTE
Workup remains at negative.  Continues with follow-up with endocrinology.  Currently on spironolactone 80 mg daily for blood pressure issues.

## 2024-03-08 NOTE — ASSESSMENT & PLAN NOTE
Discussed diet and exercise.  He is on aggressive weightlifter, does not do a lot of aerobic exercise.  Strongly encouraged walking the treadmill more.  Reviewed his medications.  Notably following with endocrine as well.

## 2024-03-13 DIAGNOSIS — E29.1 HYPOGONADISM IN MALE: ICD-10-CM

## 2024-03-13 RX ORDER — TESTOSTERONE GEL, 1% 10 MG/G
GEL TRANSDERMAL
Qty: 150 G | Refills: 0 | Status: SHIPPED | OUTPATIENT
Start: 2024-03-13

## 2024-03-15 ENCOUNTER — TELEPHONE (OUTPATIENT)
Age: 49
End: 2024-03-15

## 2024-03-18 ENCOUNTER — TELEPHONE (OUTPATIENT)
Age: 49
End: 2024-03-18

## 2024-03-18 ENCOUNTER — PREP FOR PROCEDURE (OUTPATIENT)
Age: 49
End: 2024-03-18

## 2024-03-18 DIAGNOSIS — Z12.11 SCREENING FOR COLON CANCER: Primary | ICD-10-CM

## 2024-03-18 NOTE — TELEPHONE ENCOUNTER
03/18/24  Screened by: Elle Haney    Referring Provider Dr. Farrell    Pre- Screening:     There is no height or weight on file to calculate BMI. 35.48  Has patient been referred for a routine screening Colonoscopy? yes  Is the patient between 45-75 years old? yes      Previous Colonoscopy no   If yes:    Date:     Facility:     Reason:           Does the patient want to see a Gastroenterologist prior to their procedure OR are they having any GI symptoms? no    Has the patient been hospitalized or had abdominal surgery in the past 6 months? no    Does the patient use supplemental oxygen? no    Does the patient take Coumadin, Lovenox, Plavix, Elliquis, Xarelto, or other blood thinning medication? no    Has the patient had a stroke, cardiac event, or stent placed in the past year? no        If patient is between 45yrs - 49yrs, please advise patient that we will have to confirm benefits & coverage with their insurance company for a routine screening colonoscopy.

## 2024-03-19 ENCOUNTER — TELEPHONE (OUTPATIENT)
Dept: FAMILY MEDICINE CLINIC | Facility: CLINIC | Age: 49
End: 2024-03-19

## 2024-03-19 DIAGNOSIS — E78.2 MIXED HYPERLIPIDEMIA: ICD-10-CM

## 2024-03-19 RX ORDER — ATORVASTATIN CALCIUM 20 MG/1
20 TABLET, FILM COATED ORAL DAILY
Qty: 90 TABLET | Refills: 3 | Status: SHIPPED | OUTPATIENT
Start: 2024-03-19 | End: 2024-06-17

## 2024-03-19 NOTE — TELEPHONE ENCOUNTER
Notify patient, labs reviewed and all look pretty good.  Cholesterol remains higher than our target.  Would increase his Lipitor from 10 mg to 20 mg daily.  A new prescription for that was sent to [facility].  All other levels, including his testosterone levels appear within the normal range.

## 2024-04-11 ENCOUNTER — HOSPITAL ENCOUNTER (OUTPATIENT)
Dept: GASTROENTEROLOGY | Facility: HOSPITAL | Age: 49
Setting detail: OUTPATIENT SURGERY
End: 2024-04-11
Payer: COMMERCIAL

## 2024-04-11 ENCOUNTER — ANESTHESIA EVENT (OUTPATIENT)
Dept: GASTROENTEROLOGY | Facility: HOSPITAL | Age: 49
End: 2024-04-11

## 2024-04-11 ENCOUNTER — ANESTHESIA (OUTPATIENT)
Dept: GASTROENTEROLOGY | Facility: HOSPITAL | Age: 49
End: 2024-04-11

## 2024-04-11 VITALS
TEMPERATURE: 98.8 F | SYSTOLIC BLOOD PRESSURE: 141 MMHG | RESPIRATION RATE: 16 BRPM | HEART RATE: 69 BPM | OXYGEN SATURATION: 98 % | DIASTOLIC BLOOD PRESSURE: 72 MMHG

## 2024-04-11 DIAGNOSIS — Z12.11 SCREENING FOR COLON CANCER: ICD-10-CM

## 2024-04-11 PROBLEM — G47.33 OBSTRUCTIVE SLEEP APNEA SYNDROME: Status: ACTIVE | Noted: 2024-04-11

## 2024-04-11 PROBLEM — E66.9 CLASS 2 OBESITY IN ADULT: Status: ACTIVE | Noted: 2024-04-11

## 2024-04-11 PROBLEM — E66.812 CLASS 2 OBESITY IN ADULT: Status: ACTIVE | Noted: 2024-04-11

## 2024-04-11 PROCEDURE — 88305 TISSUE EXAM BY PATHOLOGIST: CPT | Performed by: PATHOLOGY

## 2024-04-11 RX ORDER — PROPOFOL 10 MG/ML
INJECTION, EMULSION INTRAVENOUS CONTINUOUS PRN
Status: DISCONTINUED | OUTPATIENT
Start: 2024-04-11 | End: 2024-04-11

## 2024-04-11 RX ORDER — PROPOFOL 10 MG/ML
INJECTION, EMULSION INTRAVENOUS AS NEEDED
Status: DISCONTINUED | OUTPATIENT
Start: 2024-04-11 | End: 2024-04-11

## 2024-04-11 RX ORDER — SODIUM CHLORIDE, SODIUM LACTATE, POTASSIUM CHLORIDE, CALCIUM CHLORIDE 600; 310; 30; 20 MG/100ML; MG/100ML; MG/100ML; MG/100ML
125 INJECTION, SOLUTION INTRAVENOUS CONTINUOUS
Status: DISCONTINUED | OUTPATIENT
Start: 2024-04-11 | End: 2024-04-15 | Stop reason: HOSPADM

## 2024-04-11 RX ADMIN — PROPOFOL 100 MCG/KG/MIN: 10 INJECTION, EMULSION INTRAVENOUS at 12:32

## 2024-04-11 RX ADMIN — SODIUM CHLORIDE, SODIUM LACTATE, POTASSIUM CHLORIDE, AND CALCIUM CHLORIDE 125 ML/HR: .6; .31; .03; .02 INJECTION, SOLUTION INTRAVENOUS at 12:09

## 2024-04-11 RX ADMIN — PROPOFOL 150 MG: 10 INJECTION, EMULSION INTRAVENOUS at 12:32

## 2024-04-11 NOTE — ANESTHESIA POSTPROCEDURE EVALUATION
Post-Op Assessment Note    CV Status:  Stable    Pain management: adequate       Mental Status:  Alert and awake   Hydration Status:  Euvolemic   PONV Controlled:  Controlled   Airway Patency:  Patent  Two or more mitigation strategies used for obstructive sleep apnea   Post Op Vitals Reviewed: Yes    No anethesia notable event occurred.    Staff: Anesthesiologist, CRNA               BP   138/72   Temp 97   Pulse 73   Resp 19   SpO2 95

## 2024-04-11 NOTE — ANESTHESIA PREPROCEDURE EVALUATION
Procedure:  COLONOSCOPY    Relevant Problems   CARDIO   (+) Resistant hypertension      PULMONARY   (+) Obstructive sleep apnea syndrome (No CPAP)      Behavioral Health   (+) Bipolar disorder, in full remission, most recent episode mixed (HCC)      Other   (+) Adrenal nodule (HCC)   (+) Class 2 obesity in adult        Physical Exam    Airway    Mallampati score: III  TM Distance: >3 FB  Neck ROM: full     Dental   No notable dental hx     Cardiovascular      Pulmonary      Other Findings        Anesthesia Plan  ASA Score- 3     Anesthesia Type- IV sedation with anesthesia with ASA Monitors.         Additional Monitors:     Airway Plan:            Plan Factors-Exercise tolerance (METS): >4 METS.    Chart reviewed.    Patient summary reviewed.    Patient is not a current smoker.      Obstructive sleep apnea risk education given perioperatively.        Induction-     Postoperative Plan-     Informed Consent- Anesthetic plan and risks discussed with patient.  I personally reviewed this patient with the CRNA. Discussed and agreed on the Anesthesia Plan with the CRNA..

## 2024-04-11 NOTE — H&P
[facility]  History & Physical     PATIENT INFO     Name: Ted Morales  YOB: 1975   Age: 48 y.o.   Sex: male   MRN: [id number]     HISTORY OF PRESENT ILLNESS     Ted Morales is a 48 y.o. year old male who presents for screening colonoscopy. No prior colonoscopy. No antithrombotics or anticoagulants.     REVIEW OF SYSTEMS     Per the HPI, and otherwise unremarkable.    Historical Information   Past Medical History:   Diagnosis Date    Disease of thyroid gland     Hyperlipidemia     Hypertension      Past Surgical History:   Procedure Laterality Date    TONSILLECTOMY       Social History   Social History     Substance and Sexual Activity   Alcohol Use Not Currently     Social History     Substance and Sexual Activity   Drug Use Never     Social History     Tobacco Use   Smoking Status Never    Passive exposure: Never   Smokeless Tobacco Never     History reviewed. No pertinent family history.     MEDICATIONS & ALLERGIES     Current Outpatient Medications   Medication Instructions    amLODIPine (NORVASC) 10 mg, Oral, Daily    aspirin 81 mg, Oral, Daily    atorvastatin (LIPITOR) 20 mg, Oral, Daily    carBAMazepine (TEGretol) 200 mg tablet TAKE 2 TABLETS IN THE MORNING AND 2 TABLETS IN THE EVENING, EVERY 12 HOURS    hydrALAZINE (APRESOLINE) 50 mg, Oral, 3 times daily    metoprolol succinate (TOPROL-XL) 50 mg, Oral, Daily    SEROquel  mg, Oral, Daily at bedtime    spironolactone (ALDACTONE) 50 mg, Oral, Daily    testosterone (ANDROGEL) 1% APPLY 1  PACKET TOPICALLY ONCE DAILY    testosterone (ANDROGEL) 50 mg, Topical, Daily     Allergies   Allergen Reactions    Losartan Anaphylaxis        PHYSICAL EXAM      Objective   Blood pressure 159/89, pulse 89, temperature 98.2 °F (36.8 °C), temperature source Temporal, resp. rate 18, SpO2 96%. There is no height or weight on file to calculate BMI.    General Appearance:   Alert, cooperative, no distress   Lungs:    Equal chest rise, respirations unlabored    Heart:   Regular rate and rhythm   Abdomen:   Soft, non-tender, non-distended; normal bowel sounds; no masses, no organomegaly    Extremities:   No edema       ASSESSMENT & PLAN     This is a 48 y.o. year old male here for colonoscopy, and he is stable and optimized for his procedure.      Nicho Peck D.O.  [facility]  Division of Gastroenterology & Hepatology  Available on TigerText  [email or web address]    ** Please Note: This note is constructed using a voice recognition dictation system. **

## 2024-04-15 DIAGNOSIS — E29.1 HYPOGONADISM IN MALE: ICD-10-CM

## 2024-04-16 RX ORDER — TESTOSTERONE GEL, 1% 10 MG/G
GEL TRANSDERMAL
Qty: 150 G | Refills: 0 | Status: SHIPPED | OUTPATIENT
Start: 2024-04-16

## 2024-05-14 DIAGNOSIS — I1A.0 RESISTANT HYPERTENSION: ICD-10-CM

## 2024-05-15 RX ORDER — SPIRONOLACTONE 50 MG/1
50 TABLET, FILM COATED ORAL DAILY
Qty: 90 TABLET | Refills: 1 | Status: SHIPPED | OUTPATIENT
Start: 2024-05-15

## 2024-05-17 DIAGNOSIS — E29.1 HYPOGONADISM IN MALE: ICD-10-CM

## 2024-05-20 RX ORDER — TESTOSTERONE GEL, 1% 10 MG/G
GEL TRANSDERMAL
Qty: 150 G | Refills: 0 | Status: SHIPPED | OUTPATIENT
Start: 2024-05-20

## 2024-05-22 ENCOUNTER — TELEPHONE (OUTPATIENT)
Age: 49
End: 2024-05-22

## 2024-05-22 DIAGNOSIS — E29.1 HYPOGONADISM IN MALE: ICD-10-CM

## 2024-05-22 NOTE — TELEPHONE ENCOUNTER
PA for testosterone (ANDROGEL) 1%  Approved   Date(s) approved 3/22/24-5/21/25  Case #[id number]    Patient advised by [x] [employer] Message                      [x] Phone call       Pharmacy advised by [x]Fax                                     []Phone call    Approval letter scanned into Media Yes

## 2024-05-22 NOTE — TELEPHONE ENCOUNTER
PA for testosterone (ANDROGEL) 1%     Submitted via    [x]CMM-KEY [id number]  []Surescripts-Case ID #   []Faxed to plan   []Other website   []Phone call Case ID #     Office notes sent, clinical questions answered. Awaiting determination    Turnaround time for your insurance to make a decision on your Prior Authorization can take 7-21 business days.

## 2024-05-22 NOTE — TELEPHONE ENCOUNTER
Reason for call:   [x] Refill   [] Prior Auth  [x] Other: please confirm with pharmacy if they received the rx    Office:   [x] PCP/Provider - Ron Farrell   [] Specialty/Provider -     Medication: testosterone (ANDROGEL) 1%     Dose/Frequency:  APPLY 1 PACKET TOPICALLY ONCE DAILY     Quantity: 150    [id number]     Does the patient have enough for 3 days?   [] Yes   [x] No - Send as HP to POD

## 2024-05-23 RX ORDER — TESTOSTERONE GEL, 1% 10 MG/G
GEL TRANSDERMAL
Qty: 150 G | Refills: 0 | OUTPATIENT
Start: 2024-05-23

## 2024-06-15 DIAGNOSIS — E29.1 HYPOGONADISM IN MALE: ICD-10-CM

## 2024-06-17 RX ORDER — TESTOSTERONE GEL, 1% 10 MG/G
GEL TRANSDERMAL
Qty: 150 G | Refills: 0 | Status: SHIPPED | OUTPATIENT
Start: 2024-06-21

## 2024-06-30 ENCOUNTER — OFFICE VISIT (OUTPATIENT)
Dept: URGENT CARE | Facility: CLINIC | Age: 49
End: 2024-06-30
Payer: COMMERCIAL

## 2024-06-30 VITALS
OXYGEN SATURATION: 100 % | RESPIRATION RATE: 20 BRPM | BODY MASS INDEX: 36.51 KG/M2 | WEIGHT: 269.2 LBS | TEMPERATURE: 98.3 F | HEART RATE: 98 BPM

## 2024-06-30 DIAGNOSIS — L25.5 PLANT DERMATITIS: Primary | ICD-10-CM

## 2024-06-30 PROCEDURE — 99213 OFFICE O/P EST LOW 20 MIN: CPT | Performed by: NURSE PRACTITIONER

## 2024-06-30 PROCEDURE — 96372 THER/PROPH/DIAG INJ SC/IM: CPT | Performed by: NURSE PRACTITIONER

## 2024-06-30 RX ORDER — DEXAMETHASONE SODIUM PHOSPHATE 10 MG/ML
10 INJECTION, SOLUTION INTRAMUSCULAR; INTRAVENOUS ONCE
Status: COMPLETED | OUTPATIENT
Start: 2024-06-30 | End: 2024-06-30

## 2024-06-30 RX ORDER — PREDNISONE 10 MG/1
TABLET ORAL
Qty: 30 TABLET | Refills: 0 | Status: SHIPPED | OUTPATIENT
Start: 2024-06-30

## 2024-06-30 RX ADMIN — DEXAMETHASONE SODIUM PHOSPHATE 10 MG: 10 INJECTION, SOLUTION INTRAMUSCULAR; INTRAVENOUS at 14:35

## 2024-06-30 NOTE — PATIENT INSTRUCTIONS
Patient Education     Poison Anh, Poison [place], Poison Sumac Discharge Instructions   About this topic   Poison ivy, oak, and sumac are plants that may cause rashes on the skin. The plants can be found anywhere, including the woods or your yard. The rash is caused by the oily sap of the plants. It does not smell and it is clear so you probably can't tell that it is there. Sometimes, you get a rash by touching the plants. Other times, it is from touching something else, like clothes, pets, another person, or gardening tools that have touched a plant. Smoke from burning these plants can also cause a rash.  Not everyone who comes in contact with the sap will get a reaction, but most people will. A reaction may happen a few hours after you touch the sap or it may happen up to 5 days later. If the sap gets on your skin, it may become red, swollen, and very itchy. Blisters may also form. You can't catch a rash from someone else, but you can get it from someone if they transfer the sap to you. The rash may last 1 to 3 weeks.  What care is needed at home?   Ask your doctor what you need to do when you go home. Make sure you ask questions if you do not understand what the doctor says. This way you will know what you need to do.  If you have a rash:  Do not scratch or rub your skin. This can lead to infection or spread the rash.  Try putting a cool, wet cloth on your rash.  Creams and lotions, like hydrocortisone cream and Calamine lotion, may help itching and blistering.  Take a cool shower to help ease the itching.  Take a bath using lukewarm water. Hot water can make itching worse. Adding oatmeal bath products or baking soda may also help.  After bathing or showering, blot your body dry rather than rubbing to avoid spread of the rash.  What follow-up care is needed?   Your doctor may ask you to make visits to the office to check on your progress. Be sure to keep these visits.  What drugs may be needed?   If you have a very  bad rash, your doctor may give you drugs to help with swelling and itching.  What can be done to prevent this health problem?   Learn what the plants look like and stay away from them. Poison ivy and poison oak have three leaves on one stem. Poison sumac has 7 to 13 leaves that grow in pairs.  Wear long pants, long sleeves, and gloves if you must be around these plants.  Wash your hands with soap and water within 15 minutes if you have contact with the plants.  Scrub your fingernails carefully to prevent spreading to other parts of your body.  Take a shower if the plants have touched your arms, legs, or other body parts. Wash well with soap and water.  Wash clothing and shoes with soap and hot water. Wash anything else, like gardening tools that may have touched a plant.  Wash your pets to remove oil from the fur. Pets do not get this rash but you may get it from touching oil on their fur.  Do not burn these plants. This will spread the oil into the air. It can cause very bad problems with other people if the wind is blowing.  If you are prone to getting a reaction from these plants, you may want to consider using over-the-counter products that can help block the oil from getting into the skin.  When do I need to call the doctor?   Signs of infection. These include a fever of 100.4°F (38°C) or higher, chills, wound that will not heal.  Trouble breathing or swallowing  Swelling of the face and lips or swelling that makes your eyes puffy or partially shut  A rash that covers a large part of your body, or that is spreading quickly  Pain, swelling, warmth, pus around the rash  A rash in your eyes, mouth, or genital area  Very bad itching that doesn't get better or keeps you awake at night  You are not feeling better in 2 to 3 days or you are feeling worse  Teach Back: Helping You Understand   The Teach Back Method helps you understand the information we are giving you. After you talk with the staff, tell them in your  own words what you learned. This helps to make sure the staff has described each thing clearly. It also helps to explain things that may have been confusing. Before going home, make sure you can do these:  I can tell you about my condition.  I can tell you how to care for my rash.  I can tell you how I will take extra care to prevent this from happening in the future.  I can tell you what I will do if I have trouble breathing or swallowing, or if the rash covers a large part of my face or my body.  Last Reviewed Date   2021-11-15  Consumer Information Use and Disclaimer   This generalized information is a limited summary of diagnosis, treatment, and/or medication information. It is not meant to be comprehensive and should be used as a tool to help the user understand and/or assess potential diagnostic and treatment options. It does NOT include all information about conditions, treatments, medications, side effects, or risks that may apply to a specific patient. It is not intended to be medical advice or a substitute for the medical advice, diagnosis, or treatment of a health care provider based on the health care provider's examination and assessment of a patient’s specific and unique circumstances. Patients must speak with a health care provider for complete information about their health, medical questions, and treatment options, including any risks or benefits regarding use of medications. This information does not endorse any treatments or medications as safe, effective, or approved for treating a specific patient. UpToDate, Inc. and its affiliates disclaim any warranty or liability relating to this information or the use thereof. The use of this information is governed by the Terms of Use, available at https://www.woltersPro[employer]ision Communicationsuwer.com/en/know/clinical-effectiveness-terms   Copyright   Copyright © 2024 UpToDate, Inc. and its affiliates and/or licensors. All rights reserved.

## 2024-06-30 NOTE — PROGRESS NOTES
[facility]        NAME: Ted Morales is a 48 y.o. male  : 1975    MRN: [id number]  DATE: 2024  TIME: 2:50 PM      Assessment and Plan     Plant dermatitis [L25.5]  1. Plant dermatitis  dexamethasone (PF) (DECADRON) injection 10 mg    predniSONE 10 mg tablet            Patient Instructions     Patient Instructions   Patient Education     Poison Anh, Poison [place], Poison Sumac Discharge Instructions   About this topic   Poison ivy, oak, and sumac are plants that may cause rashes on the skin. The plants can be found anywhere, including the woods or your yard. The rash is caused by the oily sap of the plants. It does not smell and it is clear so you probably can't tell that it is there. Sometimes, you get a rash by touching the plants. Other times, it is from touching something else, like clothes, pets, another person, or gardening tools that have touched a plant. Smoke from burning these plants can also cause a rash.  Not everyone who comes in contact with the sap will get a reaction, but most people will. A reaction may happen a few hours after you touch the sap or it may happen up to 5 days later. If the sap gets on your skin, it may become red, swollen, and very itchy. Blisters may also form. You can't catch a rash from someone else, but you can get it from someone if they transfer the sap to you. The rash may last 1 to 3 weeks.  What care is needed at home?   Ask your doctor what you need to do when you go home. Make sure you ask questions if you do not understand what the doctor says. This way you will know what you need to do.  If you have a rash:  Do not scratch or rub your skin. This can lead to infection or spread the rash.  Try putting a cool, wet cloth on your rash.  Creams and lotions, like hydrocortisone cream and Calamine lotion, may help itching and blistering.  Take a cool shower to help ease the itching.  Take a bath using lukewarm water. Hot water can make itching  worse. Adding oatmeal bath products or baking soda may also help.  After bathing or showering, blot your body dry rather than rubbing to avoid spread of the rash.  What follow-up care is needed?   Your doctor may ask you to make visits to the office to check on your progress. Be sure to keep these visits.  What drugs may be needed?   If you have a very bad rash, your doctor may give you drugs to help with swelling and itching.  What can be done to prevent this health problem?   Learn what the plants look like and stay away from them. Poison ivy and poison oak have three leaves on one stem. Poison sumac has 7 to 13 leaves that grow in pairs.  Wear long pants, long sleeves, and gloves if you must be around these plants.  Wash your hands with soap and water within 15 minutes if you have contact with the plants.  Scrub your fingernails carefully to prevent spreading to other parts of your body.  Take a shower if the plants have touched your arms, legs, or other body parts. Wash well with soap and water.  Wash clothing and shoes with soap and hot water. Wash anything else, like gardening tools that may have touched a plant.  Wash your pets to remove oil from the fur. Pets do not get this rash but you may get it from touching oil on their fur.  Do not burn these plants. This will spread the oil into the air. It can cause very bad problems with other people if the wind is blowing.  If you are prone to getting a reaction from these plants, you may want to consider using over-the-counter products that can help block the oil from getting into the skin.  When do I need to call the doctor?   Signs of infection. These include a fever of 100.4°F (38°C) or higher, chills, wound that will not heal.  Trouble breathing or swallowing  Swelling of the face and lips or swelling that makes your eyes puffy or partially shut  A rash that covers a large part of your body, or that is spreading quickly  Pain, swelling, warmth, pus around the  rash  A rash in your eyes, mouth, or genital area  Very bad itching that doesn't get better or keeps you awake at night  You are not feeling better in 2 to 3 days or you are feeling worse  Teach Back: Helping You Understand   The Teach Back Method helps you understand the information we are giving you. After you talk with the staff, tell them in your own words what you learned. This helps to make sure the staff has described each thing clearly. It also helps to explain things that may have been confusing. Before going home, make sure you can do these:  I can tell you about my condition.  I can tell you how to care for my rash.  I can tell you how I will take extra care to prevent this from happening in the future.  I can tell you what I will do if I have trouble breathing or swallowing, or if the rash covers a large part of my face or my body.  Last Reviewed Date   2021-11-15  Consumer Information Use and Disclaimer   This generalized information is a limited summary of diagnosis, treatment, and/or medication information. It is not meant to be comprehensive and should be used as a tool to help the user understand and/or assess potential diagnostic and treatment options. It does NOT include all information about conditions, treatments, medications, side effects, or risks that may apply to a specific patient. It is not intended to be medical advice or a substitute for the medical advice, diagnosis, or treatment of a health care provider based on the health care provider's examination and assessment of a patient’s specific and unique circumstances. Patients must speak with a health care provider for complete information about their health, medical questions, and treatment options, including any risks or benefits regarding use of medications. This information does not endorse any treatments or medications as safe, effective, or approved for treating a specific patient. UpToDate, Inc. and its affiliates disclaim any  warranty or liability relating to this information or the use thereof. The use of this information is governed by the Terms of Use, available at https://www.woltersAAIPharma Servicesuwer.com/en/know/clinical-effectiveness-terms   Copyright   Copyright © 2024 UpToDate, Inc. and its affiliates and/or licensors. All rights reserved.      Follow up with PCP in 3-5 days.  Proceed to  ER if symptoms worsen.    Chief Complaint     Chief Complaint   Patient presents with    Rash     Pt c/o rash to extremities back and eyes. C/o itching. Pt was weeding in garden          History of Present Illness     The patient was weeding out his garden over the last few days.  He reports that he now has an itchy rash all over his body--legs, arms, face, torso.  He has taken prednisone in the past, most recently December 2022 and reports tolerating it well.  His Tegretol is for mood not seizures.        Review of Systems     Review of Systems   Skin:  Positive for rash.   All other systems reviewed and are negative.        Current Medications       Current Outpatient Medications:     predniSONE 10 mg tablet, 5 tabs daily x 2 days, 4 tabs daily x 2 days, 3 tabs daily x 2 days, 2 tabs daily x 2 days, 1 tab daily x 2 days, Disp: 30 tablet, Rfl: 0    amLODIPine (NORVASC) 10 mg tablet, Take 1 tablet (10 mg total) by mouth daily, Disp: 90 tablet, Rfl: 3    aspirin 81 mg chewable tablet, Chew 81 mg daily, Disp: , Rfl:     atorvastatin (LIPITOR) 20 mg tablet, Take 1 tablet (20 mg total) by mouth daily, Disp: 90 tablet, Rfl: 3    carBAMazepine (TEGretol) 200 mg tablet, TAKE 2 TABLETS IN THE MORNING AND 2 TABLETS IN THE EVENING, EVERY 12 HOURS, Disp: 360 tablet, Rfl: 3    hydrALAZINE (APRESOLINE) 50 mg tablet, Take 1 tablet (50 mg total) by mouth 3 (three) times a day, Disp: 90 tablet, Rfl: 3    metoprolol succinate (TOPROL-XL) 50 mg 24 hr tablet, Take 1 tablet (50 mg total) by mouth daily, Disp: 90 tablet, Rfl: 3    SEROquel  MG 24 hr tablet, Take 2 tablets  (600 mg total) by mouth daily at bedtime, Disp: 180 tablet, Rfl: 3    spironolactone (ALDACTONE) 50 mg tablet, Take 1 tablet by mouth once daily, Disp: 90 tablet, Rfl: 1    testosterone (ANDROGEL) 1%, APPLY 1 PACKET TOPICALLY ONCE DAILY, Disp: 150 g, Rfl: 0  No current facility-administered medications for this visit.    Current Allergies     Allergies as of 06/30/2024 - Reviewed 06/30/2024   Allergen Reaction Noted    Losartan Anaphylaxis 04/21/2023              The following portions of the patient's history were reviewed and updated as appropriate: allergies, current medications, past family history, past medical history, past social history, past surgical history and problem list.     Past Medical History:   Diagnosis Date    Disease of thyroid gland     Hyperlipidemia     Hypertension        Past Surgical History:   Procedure Laterality Date    TONSILLECTOMY         History reviewed. No pertinent family history.      Medications have been verified.        Objective     Pulse 98   Temp 98.3 °F (36.8 °C)   Resp 20   Wt 122 kg (269 lb 3.2 oz)   SpO2 100%   BMI 36.51 kg/m²   No LMP for male patient.         Physical Exam     Physical Exam  Vitals and nursing note reviewed.   Constitutional:       General: He is not in acute distress.     Appearance: Normal appearance. He is well-developed. He is not ill-appearing, toxic-appearing or diaphoretic.   HENT:      Head: Normocephalic and atraumatic.   Pulmonary:      Effort: Pulmonary effort is normal. No respiratory distress.   Abdominal:      General: There is no distension.   Musculoskeletal:         General: Normal range of motion.   Skin:     General: Skin is warm and dry.      Capillary Refill: Capillary refill takes less than 2 seconds.      Findings: Rash present. Rash is papular.      Comments: Scattered plant dermatitis all over her body.  Does not appear to be poison ivy.  Possibly poison sumac versus other plant?   Neurological:      General: No focal  deficit present.      Mental Status: He is alert and oriented to person, place, and time.   Psychiatric:         Mood and Affect: Mood and affect and mood normal.         Behavior: Behavior normal.         Thought Content: Thought content normal.         Judgment: Judgment normal.

## 2024-07-08 DIAGNOSIS — I1A.0 RESISTANT HYPERTENSION: ICD-10-CM

## 2024-07-08 RX ORDER — HYDRALAZINE HYDROCHLORIDE 50 MG/1
50 TABLET, FILM COATED ORAL 3 TIMES DAILY
Qty: 90 TABLET | Refills: 5 | Status: SHIPPED | OUTPATIENT
Start: 2024-07-08

## 2024-07-18 DIAGNOSIS — E29.1 HYPOGONADISM IN MALE: ICD-10-CM

## 2024-07-18 RX ORDER — TESTOSTERONE GEL, 1% 10 MG/G
GEL TRANSDERMAL
Qty: 150 G | Refills: 0 | Status: SHIPPED | OUTPATIENT
Start: 2024-07-18

## 2024-07-22 NOTE — TELEPHONE ENCOUNTER
Unable to "refused". May want to check with wife with regards to his medication, as he does not really follow this. I believe this was for his testosterone. Her/She

## 2024-07-28 DIAGNOSIS — I15.9 SECONDARY HYPERTENSION: ICD-10-CM

## 2024-07-29 RX ORDER — AMLODIPINE BESYLATE 10 MG/1
10 TABLET ORAL DAILY
Qty: 90 TABLET | Refills: 1 | Status: SHIPPED | OUTPATIENT
Start: 2024-07-29

## 2024-08-13 DIAGNOSIS — I15.9 SECONDARY HYPERTENSION: ICD-10-CM

## 2024-08-13 RX ORDER — METOPROLOL SUCCINATE 50 MG/1
50 TABLET, EXTENDED RELEASE ORAL DAILY
Qty: 30 TABLET | Refills: 0 | Status: SHIPPED | OUTPATIENT
Start: 2024-08-13

## 2024-08-15 DIAGNOSIS — F32.A DEPRESSION, UNSPECIFIED DEPRESSION TYPE: ICD-10-CM

## 2024-08-15 DIAGNOSIS — E29.1 HYPOGONADISM IN MALE: ICD-10-CM

## 2024-08-15 RX ORDER — TESTOSTERONE GEL, 1% 10 MG/G
50 GEL TRANSDERMAL DAILY
Qty: 150 G | Refills: 0 | Status: SHIPPED | OUTPATIENT
Start: 2024-08-17

## 2024-08-15 RX ORDER — QUETIAPINE 300 MG/1
600 TABLET, EXTENDED RELEASE ORAL
Qty: 200 TABLET | Refills: 1 | Status: SHIPPED | OUTPATIENT
Start: 2024-08-15

## 2024-08-15 NOTE — TELEPHONE ENCOUNTER
Reason for call:   [x] Refill   [] Prior Auth  [] Other:     Office:   [] PCP/Provider - [facility]   [] Specialty/Provider -     Medication:     testosterone (ANDROGEL) 1%       Dose/Frequency: APPLY 1 PACKET TOPICALLY ONCE DAILY     Medication: SEROquel  MG 24 hr tablet     Dose/Frequency: TAKE 2 TABLETS IN THE MORNING AND 2 TABLETS IN THE EVENING, EVERY 12 HOURS     Pharmacy: [place] [id number] [address]     Does the patient have enough for 3 days?   [] Yes   [x] No - Send as HP to POD

## 2024-09-07 DIAGNOSIS — I15.9 SECONDARY HYPERTENSION: ICD-10-CM

## 2024-09-08 RX ORDER — METOPROLOL SUCCINATE 50 MG/1
50 TABLET, EXTENDED RELEASE ORAL DAILY
Qty: 30 TABLET | Refills: 0 | Status: SHIPPED | OUTPATIENT
Start: 2024-09-08

## 2024-09-21 DIAGNOSIS — E29.1 HYPOGONADISM IN MALE: ICD-10-CM

## 2024-09-23 RX ORDER — TESTOSTERONE GEL, 1% 10 MG/G
GEL TRANSDERMAL
Qty: 30 PACKET | Refills: 0 | Status: SHIPPED | OUTPATIENT
Start: 2024-09-23

## 2024-10-14 DIAGNOSIS — I15.9 SECONDARY HYPERTENSION: ICD-10-CM

## 2024-10-15 RX ORDER — METOPROLOL SUCCINATE 50 MG/1
50 TABLET, EXTENDED RELEASE ORAL DAILY
Qty: 30 TABLET | Refills: 0 | Status: SHIPPED | OUTPATIENT
Start: 2024-10-15

## 2024-10-23 DIAGNOSIS — E29.1 HYPOGONADISM IN MALE: ICD-10-CM

## 2024-10-24 RX ORDER — TESTOSTERONE GEL, 1% 10 MG/G
GEL TRANSDERMAL
Qty: 150 G | Refills: 0 | Status: SHIPPED | OUTPATIENT
Start: 2024-10-24

## 2024-11-25 DIAGNOSIS — E29.1 HYPOGONADISM IN MALE: ICD-10-CM

## 2024-11-25 NOTE — TELEPHONE ENCOUNTER
Reason for call:   [x] Refill   [] Prior Auth  [] Other:     Office:   [x] PCP/Provider -   [] Specialty/Provider -     Medication:   Testosterone 1%- apply 1 packet topically once daily      Pharmacy: [facility] [place] [place]    Does the patient have enough for 3 days?   [x] Yes   [] No - Send as HP to POD

## 2024-11-26 RX ORDER — TESTOSTERONE GEL, 1% 10 MG/G
50 GEL TRANSDERMAL DAILY
Qty: 150 G | Refills: 0 | Status: SHIPPED | OUTPATIENT
Start: 2024-11-26

## 2024-12-13 DIAGNOSIS — I1A.0 RESISTANT HYPERTENSION: ICD-10-CM

## 2024-12-13 DIAGNOSIS — I15.9 SECONDARY HYPERTENSION: ICD-10-CM

## 2024-12-16 DIAGNOSIS — I1A.0 RESISTANT HYPERTENSION: ICD-10-CM

## 2024-12-16 RX ORDER — METOPROLOL SUCCINATE 50 MG/1
50 TABLET, EXTENDED RELEASE ORAL DAILY
Qty: 30 TABLET | Refills: 0 | Status: SHIPPED | OUTPATIENT
Start: 2024-12-16

## 2024-12-16 RX ORDER — SPIRONOLACTONE 50 MG/1
50 TABLET, FILM COATED ORAL DAILY
Qty: 30 TABLET | Refills: 0 | Status: SHIPPED | OUTPATIENT
Start: 2024-12-16 | End: 2024-12-16 | Stop reason: SDUPTHER

## 2024-12-16 RX ORDER — SPIRONOLACTONE 50 MG/1
50 TABLET, FILM COATED ORAL DAILY
Qty: 100 TABLET | Refills: 3 | Status: SHIPPED | OUTPATIENT
Start: 2024-12-16

## 2024-12-16 NOTE — TELEPHONE ENCOUNTER
Patient needs an appointment. Please contact the patient to schedule an appointment. Last office visit: 3/8/24

## 2024-12-31 DIAGNOSIS — E29.1 HYPOGONADISM IN MALE: ICD-10-CM

## 2024-12-31 NOTE — TELEPHONE ENCOUNTER
Reason for call:   [x] Refill   [] Prior Auth  [] Other:     Office:   [x] PCP/Provider - VILMA LOPEZ / [facility]    Medication: testosterone packet    Dose/Frequency: 1% daily    Quantity: 150g    Pharmacy: [facility] [address] [place] [address] [id number]     Does the patient have enough for 3 days?   [x] Yes   [] No - Send as HP to POD

## 2025-01-06 DIAGNOSIS — E29.1 HYPOGONADISM IN MALE: ICD-10-CM

## 2025-01-06 RX ORDER — TESTOSTERONE GEL, 1% 10 MG/G
GEL TRANSDERMAL
Qty: 150 G | Refills: 0 | Status: SHIPPED | OUTPATIENT
Start: 2025-01-06

## 2025-01-07 ENCOUNTER — OFFICE VISIT (OUTPATIENT)
Dept: FAMILY MEDICINE CLINIC | Facility: CLINIC | Age: 50
End: 2025-01-07
Payer: COMMERCIAL

## 2025-01-07 VITALS
SYSTOLIC BLOOD PRESSURE: 134 MMHG | OXYGEN SATURATION: 98 % | HEIGHT: 72 IN | TEMPERATURE: 98 F | WEIGHT: 272 LBS | BODY MASS INDEX: 36.84 KG/M2 | DIASTOLIC BLOOD PRESSURE: 78 MMHG | HEART RATE: 70 BPM

## 2025-01-07 DIAGNOSIS — E29.1 HYPOGONADISM IN MALE: Primary | ICD-10-CM

## 2025-01-07 DIAGNOSIS — N40.1 BPH ASSOCIATED WITH NOCTURIA: ICD-10-CM

## 2025-01-07 DIAGNOSIS — R35.1 BPH ASSOCIATED WITH NOCTURIA: ICD-10-CM

## 2025-01-07 DIAGNOSIS — E27.9 ADRENAL NODULE (HCC): ICD-10-CM

## 2025-01-07 DIAGNOSIS — Z12.5 SCREENING PSA (PROSTATE SPECIFIC ANTIGEN): ICD-10-CM

## 2025-01-07 DIAGNOSIS — E78.2 MIXED HYPERLIPIDEMIA: ICD-10-CM

## 2025-01-07 DIAGNOSIS — Z12.5 SCREENING FOR PROSTATE CANCER: ICD-10-CM

## 2025-01-07 DIAGNOSIS — Z11.59 ENCOUNTER FOR HEPATITIS C SCREENING TEST FOR LOW RISK PATIENT: ICD-10-CM

## 2025-01-07 DIAGNOSIS — I1A.0 RESISTANT HYPERTENSION: ICD-10-CM

## 2025-01-07 DIAGNOSIS — F31.78 BIPOLAR DISORDER, IN FULL REMISSION, MOST RECENT EPISODE MIXED (HCC): ICD-10-CM

## 2025-01-07 PROCEDURE — 99214 OFFICE O/P EST MOD 30 MIN: CPT | Performed by: INTERNAL MEDICINE

## 2025-01-07 RX ORDER — TESTOSTERONE GEL, 1% 10 MG/G
GEL TRANSDERMAL
Qty: 150 G | Refills: 0 | Status: CANCELLED | OUTPATIENT
Start: 2025-01-07

## 2025-01-07 NOTE — PROGRESS NOTES
Name: Ted Morales      : 1975      MRN: [id number]  Encounter Provider: Ron Farrell DO  Encounter Date: 2025   Encounter department: [facility]  :  Assessment & Plan  Hypogonadism in male  Daughter in the near future.  Continue current medical regimen.  Orders:  •  Testosterone, free, total; Future    Resistant hypertension  Stable on current regimen.  Remains on hydralazine, amlodipine and spironolactone.  Orders:  •  CBC and differential; Future  •  Comprehensive metabolic panel; Future    Mixed hyperlipidemia  Will check renin and aldosterone again.  Orders:  •  Lipid Panel with Direct LDL reflex; Future    Adrenal nodule (HCC)  Repeat CTA has been ordered.  Orders:  •  Aldosterone/Renin Ratio; Future    Bipolar disorder, in full remission, most recent episode mixed (HCC)  Stable on current regimen.       Screening for prostate cancer         Screening PSA (prostate specific antigen)    Orders:  •  PSA Total (Reflex To Free); Future    Encounter for hepatitis C screening test for low risk patient  Agreeable to screening.  Orders:  •  Hepatitis C antibody; Future    BPH associated with nocturia    Orders:  •  PSA Total (Reflex To Free); Future           History of Present Illness     Offers no complaints.  Here for getting his laboratory data ordered.  Continues on testosterone.  Saw Dr. Keita back in  for an adrenal adenoma, as well as low testosterone.  Geoff feels well.  Mostly is doing well.  Blood pressure has been good.  Denies any chest pain, shortness of breath nausea or vomiting.  Up-to-date on most of his screening.  He is due for both CAT scan of his adrenal gland as well as a repeat MRI of the lumbar spine.  Laboratory work is due also in March, will try to schedule this all from March for him.      Review of Systems   Constitutional:  Negative for chills and fever.   HENT:  Negative for congestion and sore throat.    Eyes:  Negative for visual  disturbance.   Respiratory:  Negative for cough and shortness of breath.    Cardiovascular:  Negative for chest pain and palpitations.   Gastrointestinal:  Negative for abdominal pain and vomiting.   Genitourinary:  Negative for dysuria and hematuria.   Musculoskeletal:  Negative for arthralgias and back pain.   Skin:  Negative for color change and rash.   Neurological:  Negative for seizures and syncope.   All other systems reviewed and are negative.      Objective   /78 (BP Location: Left arm, Patient Position: Sitting, Cuff Size: Large)   Pulse 70   Temp 98 °F (36.7 °C) (Tympanic)   Ht 6' (1.829 m)   Wt 123 kg (272 lb)   SpO2 98%   BMI 36.89 kg/m²      Physical Exam  Vitals and nursing note reviewed.   Constitutional:       General: He is not in acute distress.     Appearance: Normal appearance. He is well-developed.   HENT:      Head: Normocephalic and atraumatic.   Eyes:      Conjunctiva/sclera: Conjunctivae normal.   Cardiovascular:      Rate and Rhythm: Normal rate and regular rhythm.      Heart sounds: No murmur heard.  Pulmonary:      Effort: Pulmonary effort is normal. No respiratory distress.      Breath sounds: Normal breath sounds.   Abdominal:      Palpations: Abdomen is soft.      Tenderness: There is no abdominal tenderness.   Musculoskeletal:         General: No swelling.      Cervical back: Neck supple.   Skin:     General: Skin is warm and dry.      Capillary Refill: Capillary refill takes less than 2 seconds.   Neurological:      General: No focal deficit present.      Mental Status: He is alert and oriented to person, place, and time.   Psychiatric:         Mood and Affect: Mood normal.

## 2025-01-07 NOTE — ASSESSMENT & PLAN NOTE
Stable on current regimen.  Remains on hydralazine, amlodipine and spironolactone.  Orders:  •  CBC and differential; Future  •  Comprehensive metabolic panel; Future

## 2025-01-07 NOTE — ASSESSMENT & PLAN NOTE
Daughter in the near future.  Continue current medical regimen.  Orders:  •  Testosterone, free, total; Future

## 2025-01-16 DIAGNOSIS — I15.9 SECONDARY HYPERTENSION: ICD-10-CM

## 2025-01-16 RX ORDER — METOPROLOL SUCCINATE 50 MG/1
50 TABLET, EXTENDED RELEASE ORAL DAILY
Qty: 30 TABLET | Refills: 0 | Status: SHIPPED | OUTPATIENT
Start: 2025-01-16

## 2025-01-23 RX ORDER — TESTOSTERONE GEL, 1% 10 MG/G
50 GEL TRANSDERMAL DAILY
Qty: 150 G | Refills: 0 | Status: SHIPPED | OUTPATIENT
Start: 2025-02-05

## 2025-01-25 ENCOUNTER — OFFICE VISIT (OUTPATIENT)
Dept: URGENT CARE | Facility: CLINIC | Age: 50
End: 2025-01-25
Payer: COMMERCIAL

## 2025-01-25 VITALS
BODY MASS INDEX: 35.68 KG/M2 | HEIGHT: 73 IN | OXYGEN SATURATION: 98 % | WEIGHT: 269.2 LBS | SYSTOLIC BLOOD PRESSURE: 146 MMHG | DIASTOLIC BLOOD PRESSURE: 88 MMHG | HEART RATE: 63 BPM | TEMPERATURE: 98.3 F | RESPIRATION RATE: 20 BRPM

## 2025-01-25 DIAGNOSIS — R09.81 SINUS CONGESTION: Primary | ICD-10-CM

## 2025-01-25 PROCEDURE — 99213 OFFICE O/P EST LOW 20 MIN: CPT

## 2025-01-25 RX ORDER — METHYLPREDNISOLONE 4 MG/1
TABLET ORAL
Qty: 21 EACH | Refills: 0 | Status: SHIPPED | OUTPATIENT
Start: 2025-01-25

## 2025-01-25 NOTE — PATIENT INSTRUCTIONS
Please take Medrol Dosepak daily for th next 5 days as prescribed.  Please take steroids with food and do not take any Ibuprofen or other NSAID containing medications as this may increase the risk of GI bleeding. You may take Tylenol if needed.     Most colds are caused by viruses, which do not respond to antibiotics. Typically, viruses are self limiting and most people recover in 7-10 days.     While sick, make sure you get lots of rest and increase your fluid intake.   You may use OTC nasal saline spray, Flonase, and Mucinex for mild congestion.   Take Tylenol or Ibuprofen for fever, mild pain, and/or body aches.  Perform salt water gargles, drink warm tea with honey, and use throat lozenges and Chloraseptic spray for sore throat.    You can also apply warm compresses over your sinuses for any sinus pressure.     While you are sick, taking vitamins may help boost your immune system and potentially aid in recovery by supporting your body's natural defense mechanisms: Vitamin D3 2000 IU daily, Vitamin C 1000mg daily , and Multivitamin daily.      If your symptoms do not improve with our current treatment plan or worsen, please schedule an appointment with your PCP or proceed to the ED.

## 2025-01-25 NOTE — PROGRESS NOTES
[facility]        NAME: Ted Morales is a 49 y.o. male  : 1975    MRN: [id number]  DATE: 2025  TIME: 12:11 PM    Assessment and Plan   Sinus congestion [R09.81]  1. Sinus congestion  methylPREDNISolone 4 MG tablet therapy pack        Discussed with patient symptoms appear to be viral in nature and not bacterial.  Discussed with patient I would not treat with antibiotics at this time.  However I will send a Medrol Dosepak for sinus inflammation.  Recommend supportive care with close PCP follow-up for no improvement or worsening of symptoms.  Patient agreeable and understands current treatment plan.    Patient Instructions     Patient Instructions   Please take Medrol Dosepak daily for th next 5 days as prescribed.  Please take steroids with food and do not take any Ibuprofen or other NSAID containing medications as this may increase the risk of GI bleeding. You may take Tylenol if needed.     Most colds are caused by viruses, which do not respond to antibiotics. Typically, viruses are self limiting and most people recover in 7-10 days.     While sick, make sure you get lots of rest and increase your fluid intake.   You may use OTC nasal saline spray, Flonase, and Mucinex for mild congestion.   Take Tylenol or Ibuprofen for fever, mild pain, and/or body aches.  Perform salt water gargles, drink warm tea with honey, and use throat lozenges and Chloraseptic spray for sore throat.    You can also apply warm compresses over your sinuses for any sinus pressure.     While you are sick, taking vitamins may help boost your immune system and potentially aid in recovery by supporting your body's natural defense mechanisms: Vitamin D3 2000 IU daily, Vitamin C 1000mg daily , and Multivitamin daily.      If your symptoms do not improve with our current treatment plan or worsen, please schedule an appointment with your PCP or proceed to the ED.        Follow up with PCP in 3-5 days.  Proceed to   ER if symptoms worsen.    Chief Complaint     Chief Complaint   Patient presents with   • Sinusitis     Sinus congestion/pain and drainage for 2 days. Denies fever, not taking any OTC meds.          History of Present Illness       49-year-old  male presents to the clinic for evaluation of sinus congestion x 2 days.  Patient reports associated symptoms including bilateral ear pressure, postnasal drip, sinus pressure, dry cough and severe sinus headaches.  Patient reports he feels like his head is going to explode.  He denies any fevers, chills, sore throat, chest pain, palpitations, shortness of breath, chest tightness, nausea, vomiting, diarrhea, body aches or dizziness.  Patient denies taking any OTC medications for symptoms at this time.        Sinusitis  Associated symptoms include congestion, coughing (dry), ear pain (bilateral), headaches and sinus pressure. Pertinent negatives include no chills, shortness of breath or sore throat.       Review of Systems   Review of Systems   Constitutional:  Negative for chills and fever.   HENT:  Positive for congestion, ear pain (bilateral), postnasal drip and sinus pressure. Negative for sore throat.    Respiratory:  Positive for cough (dry). Negative for chest tightness and shortness of breath.    Cardiovascular:  Negative for chest pain and palpitations.   Gastrointestinal:  Negative for diarrhea, nausea and vomiting.   Musculoskeletal:  Negative for arthralgias and myalgias.   Neurological:  Positive for headaches. Negative for dizziness and light-headedness.         Current Medications       Current Outpatient Medications:   •  amLODIPine (NORVASC) 10 mg tablet, Take 1 tablet by mouth once daily, Disp: 90 tablet, Rfl: 1  •  aspirin 81 mg chewable tablet, Chew 81 mg daily, Disp: , Rfl:   •  carBAMazepine (TEGretol) 200 mg tablet, TAKE 2 TABLETS IN THE MORNING AND 2 TABLETS IN THE EVENING, EVERY 12 HOURS, Disp: 360 tablet, Rfl: 3  •  hydrALAZINE (APRESOLINE) 50 mg  "tablet, TAKE 1 TABLET BY MOUTH THREE TIMES DAILY, Disp: 90 tablet, Rfl: 5  •  methylPREDNISolone 4 MG tablet therapy pack, Use as directed on package, Disp: 21 each, Rfl: 0  •  metoprolol succinate (TOPROL-XL) 50 mg 24 hr tablet, Take 1 tablet by mouth once daily, Disp: 30 tablet, Rfl: 0  •  SEROquel  MG 24 hr tablet, Take 2 tablets (600 mg total) by mouth daily at bedtime, Disp: 200 tablet, Rfl: 1  •  spironolactone (ALDACTONE) 50 mg tablet, Take 1 tablet (50 mg total) by mouth daily, Disp: 100 tablet, Rfl: 3  •  [START ON 2/5/2025] testosterone (ANDROGEL) 1%, Apply 1 packet (50 mg total) topically daily Do not start before February 5, 2025., Disp: 150 g, Rfl: 0  •  testosterone (ANDROGEL) 1%, APPLY 1  PACKET TOPICALLY ONCE DAILY, Disp: 150 g, Rfl: 0  •  atorvastatin (LIPITOR) 20 mg tablet, Take 1 tablet (20 mg total) by mouth daily, Disp: 90 tablet, Rfl: 3    Current Allergies     Allergies as of 01/25/2025 - Reviewed 01/25/2025   Allergen Reaction Noted   • Losartan Anaphylaxis 04/21/2023            The following portions of the patient's history were reviewed and updated as appropriate: allergies, current medications, past family history, past medical history, past social history, past surgical history and problem list.     Past Medical History:   Diagnosis Date   • Disease of thyroid gland    • Hyperlipidemia    • Hypertension        Past Surgical History:   Procedure Laterality Date   • TONSILLECTOMY         History reviewed. No pertinent family history.      Medications have been verified.        Objective   /88   Pulse 63   Temp 98.3 °F (36.8 °C)   Resp 20   Ht 6' 1\" (1.854 m)   Wt 122 kg (269 lb 3.2 oz)   SpO2 98%   BMI 35.52 kg/m²        Physical Exam     Physical Exam  Vitals and nursing note reviewed.   Constitutional:       Appearance: Normal appearance.   HENT:      Head: Normocephalic and atraumatic.      Right Ear: Ear canal and external ear normal. A middle ear effusion is " present. Tympanic membrane is not erythematous or bulging.      Left Ear: Ear canal and external ear normal. A middle ear effusion is present. Tympanic membrane is not erythematous or bulging.      Nose: Mucosal edema, congestion and rhinorrhea present.      Right Sinus: No maxillary sinus tenderness or frontal sinus tenderness.      Left Sinus: No maxillary sinus tenderness or frontal sinus tenderness.      Mouth/Throat:      Pharynx: Posterior oropharyngeal erythema (mild) and postnasal drip present.   Eyes:      General:         Right eye: No discharge.         Left eye: No discharge.      Conjunctiva/sclera: Conjunctivae normal.   Cardiovascular:      Rate and Rhythm: Normal rate and regular rhythm.      Pulses: Normal pulses.      Heart sounds: Normal heart sounds.   Pulmonary:      Effort: Pulmonary effort is normal.      Breath sounds: Normal breath sounds.   Abdominal:      General: Bowel sounds are normal.      Palpations: Abdomen is soft.   Lymphadenopathy:      Cervical: No cervical adenopathy.   Skin:     General: Skin is warm and dry.   Neurological:      General: No focal deficit present.      Mental Status: He is alert.   Psychiatric:         Mood and Affect: Mood normal.         Behavior: Behavior normal.

## 2025-01-27 ENCOUNTER — DOCUMENTATION (OUTPATIENT)
Dept: ADMINISTRATIVE | Facility: OTHER | Age: 50
End: 2025-01-27

## 2025-01-27 NOTE — PROGRESS NOTES
ROCHELLE Blakely  P Patient Reported Team         Blood pressure elevated  Appointment department: [facility]  Appointment provider: ROCHELLE Blakely  Blood pressure  01/25/25 1204 146/88  01/25/25 1134 156/90  01/27/25 11:27 AM    Patient was called after the Urgent Care visit ; a message was left for the patient to return the call  Spoke with Patient's wife and he is unavailable until Wednesday.   Thank you.  Pablo Butler MA  [facility]

## 2025-02-11 NOTE — TELEPHONE ENCOUNTER
Patient called requesting refill for testosterone. Patient made aware medication was refilled on 2/5/25 for 150 g  with 0 refills to [facility] pharmacy. Patient instructed to contact the pharmacy to obtain refills of medication. Patient verbalized understanding.

## 2025-02-24 DIAGNOSIS — F32.A DEPRESSION, UNSPECIFIED DEPRESSION TYPE: ICD-10-CM

## 2025-02-24 NOTE — TELEPHONE ENCOUNTER
Reason for call:   [x] Refill   [] Prior Auth  [] Other:     Office:   [x] PCP/Provider -   [] Specialty/Provider -     Medication: SEROquel  MG 24 hr tablet     Dose/Frequency:  Take 2 tablets (600 mg total) by mouth daily     Quantity: 200 tablet    Pharmacy: [facility] [address] [place] [address]      Does the patient have enough for 3 days?   [] Yes   [x] No - Send as HP to POD

## 2025-02-25 DIAGNOSIS — I15.9 SECONDARY HYPERTENSION: ICD-10-CM

## 2025-02-25 RX ORDER — METOPROLOL SUCCINATE 50 MG/1
50 TABLET, EXTENDED RELEASE ORAL DAILY
Qty: 30 TABLET | Refills: 0 | Status: SHIPPED | OUTPATIENT
Start: 2025-02-25

## 2025-02-25 RX ORDER — QUETIAPINE 300 MG/1
600 TABLET, EXTENDED RELEASE ORAL
Qty: 200 TABLET | Refills: 3 | Status: SHIPPED | OUTPATIENT
Start: 2025-02-25

## 2025-02-28 DIAGNOSIS — I1A.0 RESISTANT HYPERTENSION: ICD-10-CM

## 2025-02-28 RX ORDER — HYDRALAZINE HYDROCHLORIDE 50 MG/1
50 TABLET, FILM COATED ORAL 3 TIMES DAILY
Qty: 270 TABLET | Refills: 1 | Status: SHIPPED | OUTPATIENT
Start: 2025-02-28

## 2025-02-28 NOTE — TELEPHONE ENCOUNTER
Reason for call:   [x] Refill   [] Prior Auth  [] Other:     Office:   [x] PCP/Provider - VILMA LOPEZ / Bud    Medication: hydralazine    Dose/Frequency: 50mg tid    Quantity: 270    Pharmacy: [facility] [address] [place], [place] - [address]     Does the patient have enough for 3 days?   [] Yes   [x] No - Send as HP to POD

## 2025-03-13 LAB
ALBUMIN SERPL-MCNC: 4.2 G/DL (ref 3.5–5.7)
ALP SERPL-CCNC: 52 U/L (ref 35–120)
ALT SERPL-CCNC: 20 U/L
ANION GAP SERPL CALCULATED.3IONS-SCNC: 9 MMOL/L (ref 3–11)
AST SERPL-CCNC: 24 U/L
BASOPHILS # BLD AUTO: 0 THOU/CMM (ref 0–0.1)
BASOPHILS NFR BLD AUTO: 0 %
BILIRUB SERPL-MCNC: 0.4 MG/DL (ref 0.2–1)
BUN SERPL-MCNC: 13 MG/DL (ref 7–28)
CALCIUM SERPL-MCNC: 9.6 MG/DL (ref 8.5–10.5)
CHLORIDE SERPL-SCNC: 103 MMOL/L (ref 100–109)
CHOLEST SERPL-MCNC: 183 MG/DL
CHOLEST/HDLC SERPL: 4 {RATIO}
CO2 SERPL-SCNC: 29 MMOL/L (ref 21–31)
CREAT SERPL-MCNC: 1.3 MG/DL (ref 0.53–1.3)
CYTOLOGY CMNT CVX/VAG CYTO-IMP: NORMAL
DIFFERENTIAL METHOD BLD: ABNORMAL
EOSINOPHIL # BLD AUTO: 0.1 THOU/CMM (ref 0–0.5)
EOSINOPHIL NFR BLD AUTO: 2 %
ERYTHROCYTE [DISTWIDTH] IN BLOOD BY AUTOMATED COUNT: 12.3 % (ref 12–16)
GFR/BSA.PRED SERPLBLD CYS-BASED-ARV: 67 ML/MIN/{1.73_M2}
GLUCOSE SERPL-MCNC: 94 MG/DL (ref 65–99)
HCT VFR BLD AUTO: 42.6 % (ref 37–48)
HCV AB SERPL QL IA: NONREACTIVE
HDLC SERPL-MCNC: 46 MG/DL (ref 23–92)
HGB BLD-MCNC: 14.6 G/DL (ref 12.5–17)
LDLC SERPL CALC-MCNC: 105 MG/DL
LYMPHOCYTES # BLD AUTO: 2.3 THOU/CMM (ref 1–3)
LYMPHOCYTES NFR BLD AUTO: 42 %
MCH RBC QN AUTO: 31.5 PG (ref 27–36)
MCHC RBC AUTO-ENTMCNC: 34.4 G/DL (ref 32–37)
MCV RBC AUTO: 92 FL (ref 80–100)
MONOCYTES # BLD AUTO: 0.5 THOU/CMM (ref 0.3–1)
MONOCYTES NFR BLD AUTO: 9 %
NEUTROPHILS # BLD AUTO: 2.6 THOU/CMM (ref 1.8–7.8)
NEUTROPHILS NFR BLD AUTO: 47 %
NONHDLC SERPL-MCNC: 137 MG/DL
PLATELET # BLD AUTO: 302 THOU/CMM (ref 140–350)
PMV BLD REES-ECKER: 7.4 FL (ref 7.5–11.3)
POTASSIUM SERPL-SCNC: 4.3 MMOL/L (ref 3.5–5.2)
PROT SERPL-MCNC: 6.5 G/DL (ref 6.3–8.3)
PSA SERPL-MCNC: 0.47 NG/ML
RBC # BLD AUTO: 4.65 MILL/CMM (ref 4–5.4)
SHBG SERPL-SCNC: 42.2 NMOL/L (ref 13.3–89.5)
SODIUM SERPL-SCNC: 141 MMOL/L (ref 135–145)
TESTOST FREE MFR SERPL: 1.8 % (ref 1.5–3.2)
TESTOST FREE SERPL-MCNC: 92.8 PG/ML (ref 21–135)
TESTOST SERPL-MCNC: 514 NG/DL (ref 150–684)
TESTOSTERONE.FREE+WB MFR SERPL: 42.3 %
TESTOSTERONE.FREE+WB SERPL-MCNC: 218 NG/DL (ref 48–317)
TRIGL SERPL-MCNC: 159 MG/DL
WBC # BLD AUTO: 5.5 THOU/CMM (ref 4–10.5)

## 2025-03-14 ENCOUNTER — RESULTS FOLLOW-UP (OUTPATIENT)
Dept: FAMILY MEDICINE CLINIC | Facility: CLINIC | Age: 50
End: 2025-03-14

## 2025-03-16 DIAGNOSIS — E29.1 HYPOGONADISM IN MALE: ICD-10-CM

## 2025-03-16 LAB
ALDOST SERPL-MCNC: 13.5 NG/DL
ALDOST/RENIN PLAS-RTO: 11.2 RATIO (ref 0.9–28.9)
RENIN PLAS-CCNC: 1.2 NG/ML/H

## 2025-03-17 ENCOUNTER — TELEPHONE (OUTPATIENT)
Dept: FAMILY MEDICINE CLINIC | Facility: CLINIC | Age: 50
End: 2025-03-17

## 2025-03-17 ENCOUNTER — RESULTS FOLLOW-UP (OUTPATIENT)
Dept: FAMILY MEDICINE CLINIC | Facility: CLINIC | Age: 50
End: 2025-03-17

## 2025-03-17 RX ORDER — TESTOSTERONE GEL, 1% 10 MG/G
GEL TRANSDERMAL
Qty: 150 G | Refills: 0 | Status: SHIPPED | OUTPATIENT
Start: 2025-03-17

## 2025-03-17 NOTE — TELEPHONE ENCOUNTER
Patient called requesting refill for testosterone (ANDROGEL) 1%. Patient made aware medication was refilled on 3/17/25 for 150 g with 0 refills to [facility] [address] [facility] [address]  [address]acy. Patient instructed to contact the pharmacy to obtain refills of medication. Patient verbalized understanding.

## 2025-03-18 ENCOUNTER — RESULTS FOLLOW-UP (OUTPATIENT)
Dept: FAMILY MEDICINE CLINIC | Facility: CLINIC | Age: 50
End: 2025-03-18

## 2025-03-29 DIAGNOSIS — I15.9 SECONDARY HYPERTENSION: ICD-10-CM

## 2025-03-29 DIAGNOSIS — E78.2 MIXED HYPERLIPIDEMIA: ICD-10-CM

## 2025-03-30 RX ORDER — ATORVASTATIN CALCIUM 20 MG/1
20 TABLET, FILM COATED ORAL DAILY
Qty: 30 TABLET | Refills: 0 | Status: SHIPPED | OUTPATIENT
Start: 2025-03-30

## 2025-03-30 RX ORDER — AMLODIPINE BESYLATE 10 MG/1
10 TABLET ORAL DAILY
Qty: 90 TABLET | Refills: 0 | Status: SHIPPED | OUTPATIENT
Start: 2025-03-30

## 2025-03-30 RX ORDER — METOPROLOL SUCCINATE 50 MG/1
50 TABLET, EXTENDED RELEASE ORAL DAILY
Qty: 30 TABLET | Refills: 0 | Status: SHIPPED | OUTPATIENT
Start: 2025-03-30

## 2025-04-19 DIAGNOSIS — E29.1 HYPOGONADISM IN MALE: ICD-10-CM

## 2025-04-21 RX ORDER — TESTOSTERONE GEL, 1% 10 MG/G
GEL TRANSDERMAL
Qty: 150 G | Refills: 0 | Status: SHIPPED | OUTPATIENT
Start: 2025-04-21

## 2025-05-01 DIAGNOSIS — E78.2 MIXED HYPERLIPIDEMIA: ICD-10-CM

## 2025-05-01 DIAGNOSIS — I15.9 SECONDARY HYPERTENSION: ICD-10-CM

## 2025-05-01 DIAGNOSIS — F32.A DEPRESSION, UNSPECIFIED DEPRESSION TYPE: ICD-10-CM

## 2025-05-01 RX ORDER — METOPROLOL SUCCINATE 50 MG/1
50 TABLET, EXTENDED RELEASE ORAL DAILY
Qty: 30 TABLET | Refills: 5 | Status: SHIPPED | OUTPATIENT
Start: 2025-05-01

## 2025-05-01 RX ORDER — ATORVASTATIN CALCIUM 20 MG/1
20 TABLET, FILM COATED ORAL DAILY
Qty: 30 TABLET | Refills: 5 | Status: SHIPPED | OUTPATIENT
Start: 2025-05-01

## 2025-05-02 RX ORDER — CARBAMAZEPINE 200 MG/1
TABLET ORAL
Qty: 360 TABLET | Refills: 0 | Status: SHIPPED | OUTPATIENT
Start: 2025-05-02

## 2025-05-18 DIAGNOSIS — E29.1 HYPOGONADISM IN MALE: ICD-10-CM

## 2025-05-19 RX ORDER — TESTOSTERONE GEL, 1% 10 MG/G
GEL TRANSDERMAL
Qty: 150 G | Refills: 0 | Status: SHIPPED | OUTPATIENT
Start: 2025-05-21

## 2025-05-30 ENCOUNTER — TELEPHONE (OUTPATIENT)
Age: 50
End: 2025-05-30

## 2025-05-30 ENCOUNTER — TELEPHONE (OUTPATIENT)
Dept: FAMILY MEDICINE CLINIC | Facility: CLINIC | Age: 50
End: 2025-05-30

## 2025-05-30 DIAGNOSIS — E29.1 HYPOGONADISM IN MALE: Primary | ICD-10-CM

## 2025-05-30 RX ORDER — TESTOSTERONE 1.62 MG/G
40.5 GEL TRANSDERMAL EVERY MORNING
Qty: 60 ACTUATION | Refills: 3 | Status: SHIPPED | OUTPATIENT
Start: 2025-05-30

## 2025-05-30 RX ORDER — TESTOSTERONE GEL, 1% 10 MG/G
4 GEL TRANSDERMAL DAILY
Qty: 150 G | Refills: 1 | Status: SHIPPED | OUTPATIENT
Start: 2025-05-30 | End: 2025-05-30

## 2025-05-30 NOTE — TELEPHONE ENCOUNTER
PA for testosterone (ANDROGEL) 1.62 % TD gel pump SUBMITTED to     via    [x]CMM-KEY: [id number]  []Surescripts-Case ID #   []Availity-Auth ID # NDC #   []Faxed to plan   []Other website   []Phone call Case ID #     [x]PA sent as URGENT    All office notes, labs and other pertaining documents and studies sent. Clinical questions answered. Awaiting determination from insurance company.     Turnaround time for your insurance to make a decision on your Prior Authorization can take 7-21 business days.

## 2025-05-30 NOTE — TELEPHONE ENCOUNTER
Patient called the RX Refill Line. Message is being forwarded to the office.     Patient spouse called requesting new script for testosterone tubes. Per [facility] does not have the packets available until mid-June and the Pt has been without medication going on 2 weeks. Please review, thank you.     [facility] [address] [place] [address] - [address]

## 2025-05-30 NOTE — TELEPHONE ENCOUNTER
Patient's wife called, she is asking for a call back about her husbands medication. [facility] does not have that testosterone pump script.      Please advise, Thank you

## 2025-06-02 NOTE — TELEPHONE ENCOUNTER
PA for testosterone (ANDROGEL) 1.62 % TD gel pump  APPROVED     Date(s) approved 03/30/2025 to 05/29/2026    Case #[id number]    Patient advised by          [x][employer] Message  []Phone call   []LMOM  []L/M to call office as no active Communication consent on file  []Unable to leave detailed message as VM not approved on Communication consent       Pharmacy advised by    [x]Fax  []Phone call  []Secure Chat       Approval letter scanned into Media Yes

## 2025-07-03 ENCOUNTER — OFFICE VISIT (OUTPATIENT)
Dept: FAMILY MEDICINE CLINIC | Facility: CLINIC | Age: 50
End: 2025-07-03
Payer: COMMERCIAL

## 2025-07-03 VITALS
TEMPERATURE: 98.6 F | WEIGHT: 258 LBS | HEIGHT: 73 IN | HEART RATE: 71 BPM | BODY MASS INDEX: 34.19 KG/M2 | DIASTOLIC BLOOD PRESSURE: 78 MMHG | SYSTOLIC BLOOD PRESSURE: 128 MMHG | OXYGEN SATURATION: 98 %

## 2025-07-03 DIAGNOSIS — R73.9 ELEVATED BLOOD SUGAR: ICD-10-CM

## 2025-07-03 DIAGNOSIS — E78.2 MIXED HYPERLIPIDEMIA: ICD-10-CM

## 2025-07-03 DIAGNOSIS — F31.78 BIPOLAR DISORDER, IN FULL REMISSION, MOST RECENT EPISODE MIXED (HCC): ICD-10-CM

## 2025-07-03 DIAGNOSIS — I1A.0 RESISTANT HYPERTENSION: ICD-10-CM

## 2025-07-03 DIAGNOSIS — G47.33 OBSTRUCTIVE SLEEP APNEA SYNDROME: ICD-10-CM

## 2025-07-03 DIAGNOSIS — E29.1 HYPOGONADISM IN MALE: ICD-10-CM

## 2025-07-03 DIAGNOSIS — Z00.00 ANNUAL PHYSICAL EXAM: Primary | ICD-10-CM

## 2025-07-03 PROCEDURE — 99396 PREV VISIT EST AGE 40-64: CPT | Performed by: INTERNAL MEDICINE

## 2025-07-03 NOTE — PROGRESS NOTES
Adult Annual Physical  Name: Ted Morales      : 1975      MRN: [id number]  Encounter Provider: Ron Farrell DO  Encounter Date: 7/3/2025   Encounter department: [facility]    :  Assessment & Plan  Annual physical exam  Discussed healthy lifestyle.  Need for exercise.  Discussed supplements and vitamin replacements.  Recommended adequate sleep.  Continue with at least yearly well visits, and follow-up on the immunizations and screening tools that we advised today.        Obstructive sleep apnea syndrome  Intermittently compliant with CPAP.  He is interested in speaking about the inspire.  Referred to sleep medicine.  Orders:  •  Ambulatory Referral to Sleep Medicine; Future    Resistant hypertension  Doing well, since the addition of his spironolactone has really controlled his blood pressure.  Orders:  •  CBC and differential; Future  •  Comprehensive metabolic panel; Future    Mixed hyperlipidemia  Due for levels.  Orders:  •  Lipid Panel with Direct LDL reflex; Future    Hypogonadism in male  Continues on testosterone replacement.  Orders:  •  Testosterone, free, total; Future    Elevated blood sugar    Orders:  •  Hemoglobin A1C; Future    Bipolar disorder, in full remission, most recent episode mixed (HCC)  Stable on current regimen.           Preventive Screenings:  - Diabetes Screening: screening up-to-date  - Cholesterol Screening: screening not indicated, has hyperlipidemia and risks/benefits discussed   - Hepatitis C screening: screening up-to-date   - HIV screening: screening up-to-date   - Colon cancer screening: screening up-to-date   - Lung cancer screening: screening not indicated   - Prostate cancer screening: screening up-to-date     Immunizations:  - Immunizations due: Tdap    Counseling/Anticipatory Guidance:    - Diet: discussed recommendations for a healthy/well-balanced diet.   - Exercise: the importance of regular exercise/physical activity was  discussed. Recommend exercise 3-5 times per week for at least 30 minutes.          History of Present Illness     Adult Annual Physical:  Patient presents for annual physical. Here for his annual physical.  Offers no complaints.  Continues on his usual medications without difficulty..     Diet and Physical Activity:  - Diet/Nutrition: no special diet.  - Exercise: vigorous cardiovascular exercise, strength training exercises, 3-4 times a week on average and 1-2 hours on average.    Depression Screening:  - PHQ-2 Score: 0    General Health:  - Sleep: sleeps well, 4-6 hours of sleep on average and snores loudly.  - Hearing: normal hearing bilateral ears.  - Vision: wears glasses.  - Dental: no dental visits for > 1 year and brushes teeth twice daily.    /GYN Health:  - Follows with GYN: no.   - History of STDs: no     Health:  - History of STDs: no.   - Urinary symptoms: none.     Advanced Care Planning:  - Has an advanced directive?: no    - Has a durable medical POA?: no    - ACP document given to patient?: no      Review of Systems   Constitutional:  Negative for chills and fever.   HENT:  Negative for ear pain, rhinorrhea and sore throat.    Eyes:  Negative for pain, redness and visual disturbance.   Respiratory:  Negative for cough and shortness of breath.    Cardiovascular:  Negative for chest pain and leg swelling.   Gastrointestinal:  Negative for abdominal pain, diarrhea, nausea and vomiting.   Genitourinary:  Negative for dysuria, flank pain, frequency and urgency.   Musculoskeletal:  Positive for arthralgias and back pain. Negative for myalgias and neck pain.   Skin:  Negative for rash.   Neurological:  Negative for dizziness, weakness, light-headedness and headaches.   Hematological: Negative.    Psychiatric/Behavioral:  Negative for agitation, confusion and suicidal ideas. The patient is not nervous/anxious.    All other systems reviewed and are negative.        Objective   /78 (BP Location:  "Left arm, Patient Position: Sitting, Cuff Size: Large)   Pulse 71   Temp 98.6 °F (37 °C) (Tympanic)   Ht 6' 1\" (1.854 m)   Wt 117 kg (258 lb)   SpO2 98%   BMI 34.04 kg/m²     Physical Exam  Vitals and nursing note reviewed.   Constitutional:       General: He is not in acute distress.     Appearance: Normal appearance. He is well-developed.   HENT:      Head: Normocephalic and atraumatic.     Eyes:      Conjunctiva/sclera: Conjunctivae normal.       Cardiovascular:      Rate and Rhythm: Normal rate and regular rhythm.      Pulses: Normal pulses.      Heart sounds: Normal heart sounds. No murmur heard.  Pulmonary:      Effort: Pulmonary effort is normal. No respiratory distress.      Breath sounds: Normal breath sounds.   Abdominal:      Palpations: Abdomen is soft.      Tenderness: There is no abdominal tenderness.     Musculoskeletal:         General: No swelling.      Cervical back: Neck supple.     Skin:     General: Skin is warm and dry.      Capillary Refill: Capillary refill takes less than 2 seconds.     Neurological:      General: No focal deficit present.      Mental Status: He is alert and oriented to person, place, and time.     Psychiatric:         Mood and Affect: Mood normal.         "

## 2025-07-03 NOTE — ASSESSMENT & PLAN NOTE
Intermittently compliant with CPAP.  He is interested in speaking about the inspire.  Referred to sleep medicine.  Orders:  •  Ambulatory Referral to Sleep Medicine; Future

## 2025-07-03 NOTE — ASSESSMENT & PLAN NOTE
Doing well, since the addition of his spironolactone has really controlled his blood pressure.  Orders:  •  CBC and differential; Future  •  Comprehensive metabolic panel; Future

## 2025-07-04 DIAGNOSIS — I15.9 SECONDARY HYPERTENSION: ICD-10-CM

## 2025-07-07 RX ORDER — AMLODIPINE BESYLATE 10 MG/1
10 TABLET ORAL DAILY
Qty: 90 TABLET | Refills: 1 | Status: SHIPPED | OUTPATIENT
Start: 2025-07-07